# Patient Record
Sex: FEMALE | Race: WHITE
[De-identification: names, ages, dates, MRNs, and addresses within clinical notes are randomized per-mention and may not be internally consistent; named-entity substitution may affect disease eponyms.]

---

## 2017-10-14 ENCOUNTER — HOSPITAL ENCOUNTER (EMERGENCY)
Dept: HOSPITAL 17 - NEPE | Age: 52
Discharge: HOME | End: 2017-10-14
Payer: COMMERCIAL

## 2017-10-14 VITALS
HEART RATE: 73 BPM | OXYGEN SATURATION: 96 % | TEMPERATURE: 97.7 F | SYSTOLIC BLOOD PRESSURE: 87 MMHG | DIASTOLIC BLOOD PRESSURE: 65 MMHG | RESPIRATION RATE: 16 BRPM

## 2017-10-14 VITALS — SYSTOLIC BLOOD PRESSURE: 87 MMHG | DIASTOLIC BLOOD PRESSURE: 53 MMHG

## 2017-10-14 DIAGNOSIS — Z79.899: ICD-10-CM

## 2017-10-14 DIAGNOSIS — F17.200: ICD-10-CM

## 2017-10-14 DIAGNOSIS — R10.31: Primary | ICD-10-CM

## 2017-10-14 DIAGNOSIS — Z99.3: ICD-10-CM

## 2017-10-14 DIAGNOSIS — G82.20: ICD-10-CM

## 2017-10-14 LAB
ANION GAP SERPL CALC-SCNC: 6 MEQ/L (ref 5–15)
APTT BLD: 28.9 SEC (ref 24.3–30.1)
BASOPHILS # BLD AUTO: 0.1 TH/MM3 (ref 0–0.2)
BASOPHILS NFR BLD: 0.7 % (ref 0–2)
BUN SERPL-MCNC: 20 MG/DL (ref 7–18)
CHLORIDE SERPL-SCNC: 101 MEQ/L (ref 98–107)
EOSINOPHIL # BLD: 0.3 TH/MM3 (ref 0–0.4)
EOSINOPHIL NFR BLD: 2.3 % (ref 0–4)
ERYTHROCYTE [DISTWIDTH] IN BLOOD BY AUTOMATED COUNT: 13 % (ref 11.6–17.2)
GFR SERPLBLD BASED ON 1.73 SQ M-ARVRAT: 73 ML/MIN (ref 89–?)
HCO3 BLD-SCNC: 25.2 MEQ/L (ref 21–32)
HCT VFR BLD CALC: 40.6 % (ref 35–46)
HEMO FLAGS: (no result)
INR PPP: 1 RATIO
LYMPHOCYTES # BLD AUTO: 2.8 TH/MM3 (ref 1–4.8)
LYMPHOCYTES NFR BLD AUTO: 25.6 % (ref 9–44)
MCH RBC QN AUTO: 29.2 PG (ref 27–34)
MCHC RBC AUTO-ENTMCNC: 33.4 % (ref 32–36)
MCV RBC AUTO: 87.5 FL (ref 80–100)
MONOCYTES NFR BLD: 7.7 % (ref 0–8)
NEUTROPHILS # BLD AUTO: 7 TH/MM3 (ref 1.8–7.7)
NEUTROPHILS NFR BLD AUTO: 63.7 % (ref 16–70)
PLATELET # BLD: 217 TH/MM3 (ref 150–450)
POTASSIUM SERPL-SCNC: 4.4 MEQ/L (ref 3.5–5.1)
PROTHROMBIN TIME: 10.5 SEC (ref 9.8–11.6)
RBC # BLD AUTO: 4.64 MIL/MM3 (ref 4–5.3)
SODIUM SERPL-SCNC: 132 MEQ/L (ref 136–145)
WBC # BLD AUTO: 11 TH/MM3 (ref 4–11)

## 2017-10-14 PROCEDURE — 80048 BASIC METABOLIC PNL TOTAL CA: CPT

## 2017-10-14 PROCEDURE — 85610 PROTHROMBIN TIME: CPT

## 2017-10-14 PROCEDURE — 99285 EMERGENCY DEPT VISIT HI MDM: CPT

## 2017-10-14 PROCEDURE — 85730 THROMBOPLASTIN TIME PARTIAL: CPT

## 2017-10-14 PROCEDURE — 85025 COMPLETE CBC W/AUTO DIFF WBC: CPT

## 2017-10-14 PROCEDURE — 74177 CT ABD & PELVIS W/CONTRAST: CPT

## 2017-10-14 PROCEDURE — 96374 THER/PROPH/DIAG INJ IV PUSH: CPT

## 2017-10-14 NOTE — RADRPT
EXAM DATE/TIME:  10/14/2017 18:05 

 

[facility] COMPARISON:     

CT ABDOMEN & PELVIS W CONTRAST, November 05, 2014, 1:12.

 

 

INDICATIONS :     

Abdominal pain.

                      

 

IV CONTRAST:     

96 cc Omnipaque 350 (iohexol) IV 

 

 

ORAL CONTRAST:      

No oral contrast ingested.

                      

 

RADIATION DOSE:     

10.38 CTDIvol (mGy) 

 

 

MEDICAL HISTORY :     

Cardiovascular disease. Hepatitis. Renal calculi.Quadraplegia, TB

 

SURGICAL HISTORY :      

Tubal ligation. 

 

ENCOUNTER:      

Initial

 

ACUITY:      

1 day

 

PAIN SCALE:      

6/10

 

LOCATION:         

abdomen

 

TECHNIQUE:     

Volumetric scanning of the abdomen and pelvis was performed.  Using automated exposure control and ad
justment of the mA and/or kV according to patient size, radiation dose was kept as low as reasonably 
achievable to obtain optimal diagnostic quality images.  DICOM format image data is available electro
nically for review and comparison.  

 

FINDINGS:     

 

LOWER LUNGS:     

The visualized lower lungs are clear.

 

LIVER:     

Homogeneous density without lesion.  There is no dilation of the biliary tree.  No calcified gallston
es.

 

SPLEEN:     

Normal size without lesion.

 

PANCREAS:     

Within normal limits.

 

KIDNEYS:     

Bilateral renal cortical thinning is noted. There is no evidence of hydronephrosis. There are no susp
icious lesions or evidence of nephrolithiasis.

 

ADRENAL GLANDS:     

Within normal limits.

 

VASCULAR:     

There is no aortic aneurysm.

 

BOWEL/MESENTERY:     

The stomach, small bowel, and colon demonstrate no acute abnormality.  There is no free intraperitone
al air or fluid. Fecal impaction is noted.

 

ABDOMINAL WALL:     

Within normal limits.

 

RETROPERITONEUM:     

There is no lymphadenopathy. 

 

BLADDER:     

Ott catheter is identified. No wall thickening or mass. 

 

REPRODUCTIVE:     

Within normal limits.

 

INGUINAL:     

There is no lymphadenopathy or hernia. 

 

MUSCULOSKELETAL:     

Severe arthropathy with chronic dislocations is identified in both hips. There are prominent bilatera
l joint effusions.

 

CONCLUSION:     

1. No evidence of acute intestinal process, abdominal/pelvic inflammatory disease, hydronephrosis or 
lymphadenopathy.

2. Bilateral renal cortical scarring.

3. Chronic bilateral hip dislocations with prominent joint effusion is then advanced arthropathy.

 

 

 

 Yadiel Zaman MD on October 14, 2017 at 18:29           

Board Certified Radiologist.

 This report was verified electronically.

## 2017-10-14 NOTE — PD
HPI


Chief Complaint:  Flank/Kidney Pain


Time Seen by Provider:  17:12


Travel History


International Travel<30 days:  No


Contact w/Intl Traveler<30days:  No


Traveled to known affect area:  No





History of Present Illness


HPI


The patient was seen and examined in the presence of the nurse.  This patient 

complains of abdominal pain.  Location is right lower quadrant.  Duration 13 

hours.  Severity is moderate.  No vomiting or diarrhea or rectal bleeding or 

fever.  She denies history of abdominal surgeries.  She does have paraplegia 

due to spinal abscess and is wheelchair bound and has a chronic indwelling 

Ott.  It  was changed 4 days ago.  She is no urinary symptoms.  No 

alleviating factors.  No exacerbating factors





PFSH


Past Medical History


Arthritis:  No


Asthma:  No


Autoimmune Disease:  No


Blood Disorders:  No


Anxiety:  No


Depression:  No


Heart Rhythm Problems:  No


Cancer:  No


Cardiovascular Problems:  Yes (HYPOTENSION)


Chemotherapy:  No


Chest Pain:  No


Congestive Heart Failure:  No


COPD:  No


Cerebrovascular Accident:  No


Diabetes:  No


Diminished Hearing:  No


Endocrine:  No


GERD:  No


Glaucoma:  No


Genitourinary:  Yes (UTI)


Headaches:  No


Hepatitis:  Yes


Hiatal Hernia:  No


Hypertension:  No


Immune Disorder:  No


Kidney Stones:  No


Musculoskeletal:  Yes


Neurologic:  Yes (PARAPLEGIC)


Psychiatric:  No


Reproductive:  No


Respiratory:  Yes (TB ; PE 11/10)


Immunizations Current:  Yes


Migraines:  No


Myocardial Infarction:  No


Radiation Therapy:  No


Renal Failure:  No


Seizures:  No


Sickle Cell Disease:  No


Sleep Apnea:  No


Thyroid Disease:  No


Ulcer:  No


PNEUMOCCOCAL Vaccine (Year):  1


Pregnant?:  Not Pregnant


:  4


Para:  3


Miscarriage:  1


Ovarian Cysts:  No


Tubal Ligation:  Yes





Past Surgical History


Abdominal Surgery:  No


AICD:  No


Appendectomy:  No


Arteriovenous Shunt:  No


Cardiac Surgery:  No


Cholecystectomy:  No


Ear Surgery:  No


Endocrine Surgery:  No


Eye Surgery:  No


Genitourinary Surgery:  No


Gynecologic Surgery:  Yes


Insulin Pump:  No


Joint Replacement:  No


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


Oral Surgery:  No


Pacemaker:  No


Thoracic Surgery:  No


Other Surgery:  Yes (MMESH TO PREVENT CLOTS)





Social History


Alcohol Use:  No


Tobacco Use:  Yes (2 PPD)


Substance Use:  No





Allergies-Medications


(Allergen,Severity, Reaction):  


Coded Allergies:  


     Sulfa (Sulfonamide Antibiotics) (Unverified  Allergy, Intermediate, 

blisters, 8/15/17)


     levofloxacin (Unverified  Allergy, Intermediate, BLISTERS, 8/15/17)


     warfarin (Unverified  Adverse Reaction, Intermediate, UNABLE TO REGULATE, 8

/15/17)


Reported Meds & Prescriptions





Reported Meds & Active Scripts


Active


Reported


Lyrica (Pregabalin) 50 Mg Cap 50 Mg PO BID


Xanax (Alprazolam) 1 Mg Tab 1 Mg PO Q8H PRN


Baclofen 20 Mg Tab 20 Mg PO TID


Effexor XR 24 HR (Venlafaxine HCl) 150 Mg Cap 150 Mg PO DAILY








Review of Systems


General / Constitutional:  No: Fever


Eyes:  No: Visual changes


HENT:  No: Headaches


Cardiovascular:  No: Chest Pain or Discomfort


Respiratory:  No: Shortness of Breath


Gastrointestinal:  Positive: Abdominal Pain


Genitourinary:  No: Dysuria


Musculoskeletal:  No: Pain


Skin:  No Rash


Neurologic:  Positive: Weakness


Psychiatric:  No: Depression


Endocrine:  No: Polydipsia


Hematologic/Lymphatic:  No: Easy Bruising





Physical Exam


Narrative


GENERAL: Well-nourished, well-developed patient with some abdominal pain .


SKIN: Focused skin assessment reveals no rash and nodules. Skin is Warm and 

dry.  Macular erythema in the right inguinal fold


HEAD: Atraumatic. Normocephalic. 


EYES: Pupils equal and round. No scleral icterus. No injection or drainage. 


ENT: No nasal bleeding or discharge.  Mucous membranes pink and moist.


NECK: Trachea midline. No JVD. 


CARDIOVASCULAR: Regular rate and rhythm.  No murmur appreciated.


RESPIRATORY: No accessory muscle use. Clear to auscultation. Breath sounds 

equal bilaterally. 


GASTROINTESTINAL: Abdomen soft, mild right lower quadrant tenderness without 

rebound or guarding, nondistended. Hepatic and splenic margins not palpable. 


MUSCULOSKELETAL: No obvious deformities. No clubbing.  No cyanosis.  No edema. 


NEUROLOGICAL: Awake and alert. No obvious cranial nerve deficits.  Motor 

grossly within normal limits. Normal speech.


PSYCHIATRIC: Appropriate mood and affect; insight and judgment normal.





Data


Data


Last Documented VS





Vital Signs








  Date Time  Temp Pulse Resp B/P (MAP) Pulse Ox O2 Delivery O2 Flow Rate FiO2


 


10/14/17 17:06 97.7 73 16 87/65 (72) 96   








Orders





 Orders


Basic Metabolic Panel (Bmp) (10/14/17 17:33)


Complete Blood Count With Diff (10/14/17 17:33)


Prothrombin Time / Inr (Pt) (10/14/17 17:33)


Act Partial Throm Time (Ptt) (10/14/17 17:33)


Ct Abd/Pel W Iv Contrast(Rout) (10/14/17 17:33)


Iv Access Insert/Monitor (10/14/17 17:33)


NPO (10/14/17 17:33)


Sodium Chloride 0.9% Flush (Ns Flush) (10/14/17 17:45)


Ondansetron Inj (Zofran Inj) (10/14/17 18:00)


Morphine Inj (Morphine Inj) (10/14/17 18:00)


Iohexol 350 Inj (Omnipaque 350 Inj) (10/14/17 18:16)





Labs





Laboratory Tests








Test


  10/14/17


17:30


 


White Blood Count 11.0 TH/MM3 


 


Red Blood Count 4.64 MIL/MM3 


 


Hemoglobin 13.6 GM/DL 


 


Hematocrit 40.6 % 


 


Mean Corpuscular Volume 87.5 FL 


 


Mean Corpuscular Hemoglobin 29.2 PG 


 


Mean Corpuscular Hemoglobin


Concent 33.4 % 


 


 


Red Cell Distribution Width 13.0 % 


 


Platelet Count 217 TH/MM3 


 


Mean Platelet Volume 8.3 FL 


 


Neutrophils (%) (Auto) 63.7 % 


 


Lymphocytes (%) (Auto) 25.6 % 


 


Monocytes (%) (Auto) 7.7 % 


 


Eosinophils (%) (Auto) 2.3 % 


 


Basophils (%) (Auto) 0.7 % 


 


Neutrophils # (Auto) 7.0 TH/MM3 


 


Lymphocytes # (Auto) 2.8 TH/MM3 


 


Monocytes # (Auto) 0.8 TH/MM3 


 


Eosinophils # (Auto) 0.3 TH/MM3 


 


Basophils # (Auto) 0.1 TH/MM3 


 


CBC Comment DIFF FINAL 


 


Differential Comment  


 


Prothrombin Time 10.5 SEC 


 


Prothromb Time International


Ratio 1.0 RATIO 


 


 


Activated Partial


Thromboplast Time 28.9 SEC 


 


 


Blood Urea Nitrogen 20 MG/DL 


 


Creatinine 0.82 MG/DL 


 


Random Glucose 88 MG/DL 


 


Calcium Level 9.0 MG/DL 


 


Sodium Level 132 MEQ/L 


 


Potassium Level 4.4 MEQ/L 


 


Chloride Level 101 MEQ/L 


 


Carbon Dioxide Level 25.2 MEQ/L 


 


Anion Gap 6 MEQ/L 


 


Estimat Glomerular Filtration


Rate 73 ML/MIN 


 











MDM


Medical Decision Making


Medical Screen Exam Complete:  Yes


Emergency Medical Condition:  Yes


Medical Record Reviewed:  Yes


Differential Diagnosis


Appendicitis, colitis, ileus


Narrative Course


I have reviewed the patient's electronic medical record.  Patient was last here 

in  with UTI and chronic abdominal pain.





CBC and metabolic profiles are reasonably normal





CT scan shows no evidence of inflammatory condition, no appendicitis changes





On recheck the patient is feeling well and improved


She does have some degree of chronic abdominal pain.


Stable for outpatient follow-up


Discussed constipation/colonic stool noted with patient.  She says she is 

chronically constipated.  She denies constipating narcotics.


She will discuss bowel regimen with her physician.





Diagnosis





 Primary Impression:  


 Abdominal pain


 Qualified Codes:  R10.31 - Right lower quadrant pain


 Additional Impression:  


 Chronic paraplegia


Disposition:   DISCHARGE HOME


Condition:  Stable











Minh Marley MD Oct 14, 2017 17:43

## 2018-01-09 ENCOUNTER — HOSPITAL ENCOUNTER (INPATIENT)
Dept: HOSPITAL 17 - NEPE | Age: 53
LOS: 4 days | Discharge: HOME HEALTH | DRG: 189 | End: 2018-01-13
Attending: HOSPITALIST | Admitting: HOSPITALIST
Payer: COMMERCIAL

## 2018-01-09 VITALS
RESPIRATION RATE: 18 BRPM | DIASTOLIC BLOOD PRESSURE: 73 MMHG | SYSTOLIC BLOOD PRESSURE: 116 MMHG | OXYGEN SATURATION: 97 % | HEART RATE: 106 BPM | TEMPERATURE: 98.1 F

## 2018-01-09 VITALS
OXYGEN SATURATION: 97 % | RESPIRATION RATE: 24 BRPM | TEMPERATURE: 98.3 F | DIASTOLIC BLOOD PRESSURE: 75 MMHG | SYSTOLIC BLOOD PRESSURE: 121 MMHG | HEART RATE: 106 BPM

## 2018-01-09 VITALS — BODY MASS INDEX: 20.72 KG/M2 | HEIGHT: 68 IN | WEIGHT: 136.69 LBS

## 2018-01-09 VITALS — OXYGEN SATURATION: 91 %

## 2018-01-09 VITALS — OXYGEN SATURATION: 97 %

## 2018-01-09 VITALS — OXYGEN SATURATION: 93 %

## 2018-01-09 VITALS
HEART RATE: 68 BPM | RESPIRATION RATE: 18 BRPM | SYSTOLIC BLOOD PRESSURE: 91 MMHG | DIASTOLIC BLOOD PRESSURE: 55 MMHG | TEMPERATURE: 98.5 F | OXYGEN SATURATION: 90 %

## 2018-01-09 VITALS — OXYGEN SATURATION: 96 %

## 2018-01-09 VITALS — HEART RATE: 99 BPM

## 2018-01-09 DIAGNOSIS — J44.0: ICD-10-CM

## 2018-01-09 DIAGNOSIS — Z86.11: ICD-10-CM

## 2018-01-09 DIAGNOSIS — G89.29: ICD-10-CM

## 2018-01-09 DIAGNOSIS — G82.20: ICD-10-CM

## 2018-01-09 DIAGNOSIS — Z88.1: ICD-10-CM

## 2018-01-09 DIAGNOSIS — I10: ICD-10-CM

## 2018-01-09 DIAGNOSIS — T83.511A: ICD-10-CM

## 2018-01-09 DIAGNOSIS — J96.01: Primary | ICD-10-CM

## 2018-01-09 DIAGNOSIS — F17.210: ICD-10-CM
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
## 2018-01-09 DIAGNOSIS — N39.0: ICD-10-CM

## 2018-01-09 DIAGNOSIS — J18.9: ICD-10-CM

## 2018-01-09 DIAGNOSIS — Z86.711: ICD-10-CM

## 2018-01-09 DIAGNOSIS — E83.39: ICD-10-CM

## 2018-01-09 LAB
ALBUMIN SERPL-MCNC: 3.6 GM/DL (ref 3.4–5)
ALP SERPL-CCNC: 99 U/L (ref 45–117)
ALT SERPL-CCNC: 18 U/L (ref 10–53)
AMORPHOUS SEDIMENT, URINE: (no result)
AST SERPL-CCNC: 23 U/L (ref 15–37)
BACTERIA #/AREA URNS HPF: (no result) /HPF
BASOPHILS # BLD AUTO: 0.1 TH/MM3 (ref 0–0.2)
BASOPHILS NFR BLD: 0.4 % (ref 0–2)
BILIRUB SERPL-MCNC: 0.3 MG/DL (ref 0.2–1)
BUN SERPL-MCNC: 7 MG/DL (ref 7–18)
CALCIUM SERPL-MCNC: 9.8 MG/DL (ref 8.5–10.1)
CHLORIDE SERPL-SCNC: 98 MEQ/L (ref 98–107)
COLOR UR: YELLOW
CREAT SERPL-MCNC: 0.67 MG/DL (ref 0.5–1)
EOSINOPHIL # BLD: 0.1 TH/MM3 (ref 0–0.4)
EOSINOPHIL NFR BLD: 0.4 % (ref 0–4)
ERYTHROCYTE [DISTWIDTH] IN BLOOD BY AUTOMATED COUNT: 13.4 % (ref 11.6–17.2)
GFR SERPLBLD BASED ON 1.73 SQ M-ARVRAT: 91 ML/MIN (ref 89–?)
GLUCOSE SERPL-MCNC: 82 MG/DL (ref 74–106)
GLUCOSE UR STRIP-MCNC: (no result) MG/DL
HCO3 BLD-SCNC: 27.5 MEQ/L (ref 21–32)
HCT VFR BLD CALC: 42.8 % (ref 35–46)
HGB BLD-MCNC: 14.5 GM/DL (ref 11.6–15.3)
HGB UR QL STRIP: (no result)
KETONES UR STRIP-MCNC: (no result) MG/DL
LACTIC ACID SEPSIS PROTOCOL: 3 MMOL/L (ref 0.4–2)
LIPASE: 95 U/L (ref 73–393)
LYMPHOCYTES # BLD AUTO: 2.8 TH/MM3 (ref 1–4.8)
LYMPHOCYTES NFR BLD AUTO: 19.9 % (ref 9–44)
MAGNESIUM SERPL-MCNC: 2.2 MG/DL (ref 1.5–2.5)
MCH RBC QN AUTO: 29.3 PG (ref 27–34)
MCHC RBC AUTO-ENTMCNC: 33.8 % (ref 32–36)
MCV RBC AUTO: 86.6 FL (ref 80–100)
MONOCYTE #: 0.7 TH/MM3 (ref 0–0.9)
MONOCYTES NFR BLD: 5.2 % (ref 0–8)
MUCOUS THREADS #/AREA URNS LPF: (no result) /LPF
NEUTROPHILS # BLD AUTO: 10.4 TH/MM3 (ref 1.8–7.7)
NEUTROPHILS NFR BLD AUTO: 74.1 % (ref 16–70)
NITRITE UR QL STRIP: (no result)
PLATELET # BLD: 393 TH/MM3 (ref 150–450)
PMV BLD AUTO: 8.5 FL (ref 7–11)
PROT SERPL-MCNC: 8.4 GM/DL (ref 6.4–8.2)
RBC # BLD AUTO: 4.94 MIL/MM3 (ref 4–5.3)
SODIUM SERPL-SCNC: 133 MEQ/L (ref 136–145)
SP GR UR STRIP: 1.01 (ref 1–1.03)
TROPONIN I SERPL-MCNC: (no result) NG/ML (ref 0.02–0.05)
URINE LEUKOCYTE ESTERASE: (no result)
WBC # BLD AUTO: 14 TH/MM3 (ref 4–11)

## 2018-01-09 PROCEDURE — 87804 INFLUENZA ASSAY W/OPTIC: CPT

## 2018-01-09 PROCEDURE — 96365 THER/PROPH/DIAG IV INF INIT: CPT

## 2018-01-09 PROCEDURE — 96372 THER/PROPH/DIAG INJ SC/IM: CPT

## 2018-01-09 PROCEDURE — 85027 COMPLETE CBC AUTOMATED: CPT

## 2018-01-09 PROCEDURE — 84484 ASSAY OF TROPONIN QUANT: CPT

## 2018-01-09 PROCEDURE — 83735 ASSAY OF MAGNESIUM: CPT

## 2018-01-09 PROCEDURE — 82550 ASSAY OF CK (CPK): CPT

## 2018-01-09 PROCEDURE — 84100 ASSAY OF PHOSPHORUS: CPT

## 2018-01-09 PROCEDURE — 87086 URINE CULTURE/COLONY COUNT: CPT

## 2018-01-09 PROCEDURE — 93005 ELECTROCARDIOGRAM TRACING: CPT

## 2018-01-09 PROCEDURE — 87641 MR-STAPH DNA AMP PROBE: CPT

## 2018-01-09 PROCEDURE — 81001 URINALYSIS AUTO W/SCOPE: CPT

## 2018-01-09 PROCEDURE — 85025 COMPLETE CBC W/AUTO DIFF WBC: CPT

## 2018-01-09 PROCEDURE — 83690 ASSAY OF LIPASE: CPT

## 2018-01-09 PROCEDURE — 36600 WITHDRAWAL OF ARTERIAL BLOOD: CPT

## 2018-01-09 PROCEDURE — 96375 TX/PRO/DX INJ NEW DRUG ADDON: CPT

## 2018-01-09 PROCEDURE — 83605 ASSAY OF LACTIC ACID: CPT

## 2018-01-09 PROCEDURE — 87186 SC STD MICRODIL/AGAR DIL: CPT

## 2018-01-09 PROCEDURE — 71275 CT ANGIOGRAPHY CHEST: CPT

## 2018-01-09 PROCEDURE — 94002 VENT MGMT INPAT INIT DAY: CPT

## 2018-01-09 PROCEDURE — 3E0F7GC INTRODUCTION OF OTHER THERAPEUTIC SUBSTANCE INTO RESPIRATORY TRACT, VIA NATURAL OR ARTIFICIAL OPENING: ICD-10-PCS

## 2018-01-09 PROCEDURE — 94003 VENT MGMT INPAT SUBQ DAY: CPT

## 2018-01-09 PROCEDURE — 80048 BASIC METABOLIC PNL TOTAL CA: CPT

## 2018-01-09 PROCEDURE — 82805 BLOOD GASES W/O2 SATURATION: CPT

## 2018-01-09 PROCEDURE — 87077 CULTURE AEROBIC IDENTIFY: CPT

## 2018-01-09 PROCEDURE — 87040 BLOOD CULTURE FOR BACTERIA: CPT

## 2018-01-09 PROCEDURE — 94640 AIRWAY INHALATION TREATMENT: CPT

## 2018-01-09 PROCEDURE — 94664 DEMO&/EVAL PT USE INHALER: CPT

## 2018-01-09 PROCEDURE — 87449 NOS EACH ORGANISM AG IA: CPT

## 2018-01-09 PROCEDURE — 94667 MNPJ CHEST WALL 1ST: CPT

## 2018-01-09 PROCEDURE — 71045 X-RAY EXAM CHEST 1 VIEW: CPT

## 2018-01-09 PROCEDURE — 80053 COMPREHEN METABOLIC PANEL: CPT

## 2018-01-09 RX ADMIN — METHYLPREDNISOLONE SODIUM SUCCINATE SCH MG: 40 INJECTION, POWDER, FOR SOLUTION INTRAMUSCULAR; INTRAVENOUS at 19:49

## 2018-01-09 RX ADMIN — ENOXAPARIN SODIUM SCH MG: 40 INJECTION SUBCUTANEOUS at 16:20

## 2018-01-09 RX ADMIN — IPRATROPIUM BROMIDE AND ALBUTEROL SULFATE SCH AMPULE: .5; 3 SOLUTION RESPIRATORY (INHALATION) at 19:18

## 2018-01-09 RX ADMIN — Medication SCH ML: at 19:49

## 2018-01-09 RX ADMIN — IPRATROPIUM BROMIDE AND ALBUTEROL SULFATE SCH AMPULE: .5; 3 SOLUTION RESPIRATORY (INHALATION) at 15:44

## 2018-01-09 RX ADMIN — MORPHINE SULFATE PRN MG: 2 INJECTION, SOLUTION INTRAMUSCULAR; INTRAVENOUS at 21:31

## 2018-01-09 NOTE — HHI.HP
__________________________________________________





HPI


Service


[facility]


Primary Care Physician


Unknown


Admission Diagnosis





UTI, Hypoxia, Weakness


Diagnoses:  


Chief Complaint:  


Cough, shortness of breath


Travel History


International Travel<30 Days:  No


Contact w/Intl Traveler <30 Da:  No


Traveled to Known Affected Are:  No





Sepsis Criteria


SIRS Criteria (2 or more):  Heart rate over 90, RR  > 20 or PaCO2 < 32


Sepsis Criteria (SIRS+source):  Infect source susp/known


Severe Sepsis (+one):  Lactate >2


History of Present Illness


Ms. Campos is a pleasant 53 year old female with history of paraplegia, TB (

), PE (), neck abscess who presented to the ED on 2018 due to 

significant cough, shortness of breath that started about 10 days prior to this 

admission. She reports profuse cough production and due to coughing fits, she 

has chest pain, abdominal pain. Per EMS, her urine appeared to be cloudy too. 

Patient has chronic Ott catheter. Additionally she reports not able to sleep 

or eat much. Today she noted speck of blood in the sputum as well. In the ED, 

she was severely hypoxic and required non-rebreather to maintain O2 sat > 90%. 

Her HR was 106, Resp 24, Temp 98.3 and /75.





Review of Systems


Except as stated in HPI:  all other systems reviewed are Neg





Past Family Social History


Past Medical History


Hypotension, UTI, paraplegic, TB in , PE in 


Past Surgical History


Neck abscess drainage in 2010


Reported Medications


Lyrica (Pregabalin) 50 Mg Cap 50 Mg PO BID


Xanax (Alprazolam) 1 Mg Tab 1 Mg PO Q8H PRN


Baclofen 20 Mg Tab 20 Mg PO TID


Effexor XR 24 HR (Venlafaxine HCl) 150 Mg Cap 150 Mg PO DAILY


Allergies:  


Coded Allergies:  


     Sulfa (Sulfonamide Antibiotics) (Unverified  Allergy, Intermediate, 

blisters, 18)


     levofloxacin (Unverified  Allergy, Intermediate, BLISTERS, 18)


     warfarin (Unverified  Adverse Reaction, Intermediate, UNABLE TO REGULATE, )


Family History


Mother - Parkinson's.


Social History


Patient reports smoking half a pack a day.  Denies using illicit drugs or 

alcohol.





Physical Exam


Vital Signs





Vital Signs








  Date Time  Temp Pulse Resp B/P (MAP) Pulse Ox O2 Delivery O2 Flow Rate FiO2


 


18 14:22     97 Non-Rebreather 15.00 


 


18 13:19     96 Non-Rebreather 15.00 


 


18 13:17     96 Non-Rebreather 15.00 


 


18 13:17     96 Non-Rebreather 15.00 


 


18 13:04 98.3 106 24 121/75 (90) 97   








Physical Exam


GENERAL: This is a well-nourished, well-developed patient, in no apparent 

distress.


SKIN: No rashes, ecchymoses or lesions. Warm and dry.


HEAD: Atraumatic. Normocephalic. No temporal or scalp tenderness.


EYES: Pupils equal round and reactive. No injection or drainage. 


ENT: Nose without bleeding, purulent drainage or septal hematoma. Airway patent.


NECK: Trachea midline. No lymphadenopathy. Supple, nontender, no meningeal 

signs.


CARDIOVASCULAR: Regular rate and rhythm without murmurs, gallops, or rubs. No 

JVD. 


RESPIRATORY: Coarse breath sounds diffusely. No appreciable wheezing. No 

crackles. 


GASTROINTESTINAL: Abdomen soft, non-tender, nondistended. No guarding.


MUSCULOSKELETAL: Extremities without clubbing, cyanosis, or edema. 


NEUROLOGICAL: Awake and alert. Cranial nerves II through XII intact. No focal 

neurological deficits. Normal speech.


Laboratory





Laboratory Tests








Test


  18


13:15 18


13:25 18


13:27


 


White Blood Count 14.0   


 


Red Blood Count 4.94   


 


Hemoglobin 14.5   


 


Hematocrit 42.8   


 


Mean Corpuscular Volume 86.6   


 


Mean Corpuscular Hemoglobin 29.3   


 


Mean Corpuscular Hemoglobin


Concent 33.8 


  


  


 


 


Red Cell Distribution Width 13.4   


 


Platelet Count 393   


 


Mean Platelet Volume 8.5   


 


Neutrophils (%) (Auto) 74.1   


 


Lymphocytes (%) (Auto) 19.9   


 


Monocytes (%) (Auto) 5.2   


 


Eosinophils (%) (Auto) 0.4   


 


Basophils (%) (Auto) 0.4   


 


Neutrophils # (Auto) 10.4   


 


Lymphocytes # (Auto) 2.8   


 


Monocytes # (Auto) 0.7   


 


Eosinophils # (Auto) 0.1   


 


Basophils # (Auto) 0.1   


 


CBC Comment DIFF FINAL   


 


Differential Comment    


 


Blood Urea Nitrogen 7   


 


Creatinine 0.67   


 


Random Glucose 82   


 


Total Protein 8.4   


 


Albumin 3.6   


 


Calcium Level 9.8   


 


Magnesium Level 2.2   


 


Alkaline Phosphatase 99   


 


Aspartate Amino Transf


(AST/SGOT) 23 


  


  


 


 


Alanine Aminotransferase


(ALT/SGPT) 18 


  


  


 


 


Total Bilirubin 0.3   


 


Sodium Level 133   


 


Potassium Level 4.0   


 


Chloride Level 98   


 


Carbon Dioxide Level 27.5   


 


Anion Gap 8   


 


Estimat Glomerular Filtration


Rate 91 


  


  


 


 


Lactic Acid Level 3.0   


 


Total Creatine Kinase 90   


 


Lipase 95   


 


Urine Color  YELLOW  


 


Urine Turbidity  CLOUDY  


 


Urine pH  7.5  


 


Urine Specific Gravity  1.013  


 


Urine Protein  30  


 


Urine Glucose (UA)  NEG  


 


Urine Ketones  NEG  


 


Urine Occult Blood  SMALL  


 


Urine Nitrite  NEG  


 


Urine Bilirubin  NEG  


 


Urine Urobilinogen  LESS THAN 2.0  


 


Urine Leukocyte Esterase  LARGE  


 


Urine RBC  8  


 


Urine WBC  112  


 


Urine Amorphous Sediment  OCC  


 


Urine Bacteria  MANY  


 


Urine Mucus  MANY  


 


Microscopic Urinalysis Comment


  


  CATH-CULTURE


IND 


 


 


Blood Gas Puncture Site   RT RADIAL 


 


Blood Gas Patient Temperature   98.6 


 


Blood Gas HCO3   25 


 


Blood Gas Base Excess   1.6 


 


Blood Gas Oxygen Saturation   91 


 


Arterial Blood pH   7.44 


 


Arterial Blood Partial


Pressure CO2 


  


  38 


 


 


Arterial Blood Partial


Pressure O2 


  


  71 


 


 


Arterial Blood Oxygen Content   18.0 


 


Arterial Blood


Carboxyhemoglobin 


  


  2.3 


 


 


Arterial Blood Methemoglobin   0.8 


 


Blood Gas Hemoglobin   14.1 


 


Oxygen Delivery Device   NRM 


 


Blood Gas Liter Flow   15 














 Date/Time


Source Procedure


Growth Status


 


 


 18 13:15


Blood Peripheral Aerobic Blood Culture


Pending Received


 


 18 13:15


Blood Peripheral Anaerobic Blood Culture


Pending Received


 


 18 13:30


Nasal Aspirate Influenza Types A,B Antigen (SHANNON) - Final


NEGATIVE FOR FLU A AND B ANTIGEN.... Complete


 


 18 13:25


Urine Catheterized Urine Urine Culture


Pending Received








Result Diagram:  


18 1315                                                                    

            18 1315





Imaging





Last Impressions








Chest X-Ray 18 1305 Signed





Impressions: 





 Service Date/Time:  2018 13:17 - CONCLUSION:  1. Minimal 





 bibasilar atelectasis/scarring.     Alireza Bozorgmanesh, MD Caprini VTE Risk Assessment


Caprini VTE Risk Assessment:  Mod/High Risk (score >= 2)


Ankushi Risk Assessment Model











 Point Value = 1          Point Value = 2  Point Value = 3  Point Value = 5


 


Age 41-60


Minor surgery


BMI > 25 kg/m2


Swollen legs


Varicose veins


Pregnancy or postpartum


History of unexplained or recurrent


   spontaneous 


Oral contraceptives or hormone


   replacement


Sepsis (< 1 month)


Serious lung disease, including


   pneumonia (< 1 month)


Abnormal pulmonary function


Acute myocardial infarction


Congestive heart failure (< 1 month)


History of inflammatory bowel disease


Medical patient at bed rest Age 61-74


Arthroscopic surgery


Major open surgery (> 45 min)


Laparoscopic surgery (> 45 min)


Malignancy


Confined to bed (> 72 hours)


Immobilizing plaster cast


Central venous access Age >= 75


History of VTE


Family history of VTE


Factor V Leiden


Prothrombin 53519C


Lupus anticoagulant


Anticardiolipin antibodies


Elevated serum homocysteine


Heparin-induced thrombocytopenia


Other congenital or acquired


   thrombophilia Stroke (< 1 month)


Elective arthroplasty


Hip, pelvis, or leg fracture


Acute spinal cord injury (< 1 month)








Prophylaxis Regimen











   Total Risk


Factor Score Risk Level Prophylaxis Regimen


 


0-1      Low Early ambulation


 


2 Moderate Order ONE of the following:


*Sequential Compression Device (SCD)


*Heparin 5000 units SQ BID


 


3-4 Higher Order ONE of the following medications:


*Heparin 5000 units SQ TID


*Enoxaparin/Lovenox 40 mg SQ daily (WT < 150 kg, CrCl > 30 mL/min)


*Enoxaparin/Lovenox 30 mg SQ daily (WT < 150 kg, CrCl > 10-29 mL/min)


*Enoxaparin/Lovenox 30 mg SQ BID (WT < 150 kg, CrCl > 30 mL/min)


AND/OR


*Sequential Compression Device (SCD)


 


5 or more Highest Order ONE of the following medications:


*Heparin 5000 units SQ TID (Preferred with Epidurals)


*Enoxaparin/Lovenox 40 mg SQ daily (WT < 150 kg, CrCl > 30 mL/min)


*Enoxaparin/Lovenox 30 mg SQ daily (WT < 150 kg, CrCl > 10-29 mL/min)


*Enoxaparin/Lovenox 30 mg SQ BID (WT < 150 kg, CrCl > 30 mL/min)


AND


*Sequential Compression Device (SCD)











Assessment and Plan


Problem List:  


(1) Acute respiratory failure with hypoxia


ICD Code:  J96.01 - Acute respiratory failure with hypoxia


(2) Pneumonia


ICD Code:  J18.9 - Pneumonia, unspecified organism


(3) UTI (urinary tract infection)


ICD Code:  N39.0 - Urinary tract infection, site not specified


Status:  Acute


(4) Paraplegia


ICD Code:  G82.20 - Paraplegia, unspecified


Assessment and Plan


Ms. Campos is a pleasant 53 year old female with a history of paraplegia who 

presented to the ED on 2018 due to more than 10 day duration of productive 

cough, shortness of breath. 





- Acute respiratory failure with hypoxia


- Pneumonia


   - CXR reviewed by me, largely unremarkable.


   - Patient is requiring significant amount of O2 to keep O2 sat > 90%. 


   - We obtained a CT PE study to rule out PE and also to investigate any 

further lung pathology. 


   - CT PE shows no PE but shows patchy ground glass opacity, Posterior lung 

bases consolidation. CT PE study images reviewed by me. 


   - This could be due to atypical organisms. 


   - Will keep patient in the ICU, BiPAP if needed. 


   - Start Ceftriaxone 2g Q24hrs and Azithromycin 500mg IV Qday. If no 

improvement, we will consider anti-pseudomonal coverage. Patient is allergic to 

Levaquin. We could consider Cefepime. 


   - DuoNeb, IV steroid. 


   - Obtain Sputum culture, Urinary antigens. 





- COPD - continue steroid, abx, DuoNeb. 





- Paraplegia - occurred after neck abscess in . 





Full code. Lovenox. 





Total critical time spent more than 45 minutes.





Physician Certification


2 Midnight Certification Type:  Admission for Inpatient Services


Order for Inpatient Services


The services are ordered in accordance with Medicare regulations or non-

Medicare payer requirements, as applicable.  In the case of services not 

specified as inpatient-only, they are appropriately provided as inpatient 

services in accordance with the 2-midnight benchmark.


Estimated LOS (days):  2


 days is the estimated time the patient will need to remain in the hospital, 

assuming treatment plan goals are met and no additional complications.


Post-Hospital Plan:  Not yet determined





Problem Qualifiers





(1) UTI (urinary tract infection):  


Qualified Codes:  T83.511A - Infection and inflammatory reaction due to 

indwelling urethral catheter, initial encounter; N39.0 - Urinary tract infection

, site not specified








Valerie Ball DO 2018 15:17

## 2018-01-09 NOTE — PD.CONS
HPI


Service


Critical Care Medicine


Consult Requested By





Primary Care Physician


Unknown


History of Present Illness


53 year old female with history of paraplegia, TB (1986), PE (2010), neck 

abscess who presented to the ED on 1/9/2018 due to significant cough, shortness 

of breath that started about 10 days prior to this admission. She reports cough 

production and due to coughing fits, she has chest pain, abdominal pain. Per EMS

, her urine appeared to be cloudy too. Patient has chronic Ott catheter. 

Additionally she reports not able to sleep or eat much. Today she noted speck 

of blood in the sputum as well. In the ED, she was severely hypoxic and 

required non-rebreather to maintain O2 sat > 90%. Her HR was 106, Resp 24, Temp 

98.3 and /75.  She was admitted to ICU on hospitalist service, however 

developed worsening respiratory failure and critical care medicine was 

consulted for further management.





Review of Systems


ROS


Unobtainable patient on the facemask BiPAP





Past Family Social History


Allergies:  


Coded Allergies:  


     Sulfa (Sulfonamide Antibiotics) (Unverified  Allergy, Intermediate, 

blisters, 1/9/18)


     levofloxacin (Unverified  Allergy, Intermediate, BLISTERS, 1/9/18)


     warfarin (Unverified  Adverse Reaction, Intermediate, UNABLE TO REGULATE, 1 /9/18)


Past Medical History


Hypotension, UTI, paraplegic, TB in 1986, PE in 2010


Past Surgical History





Past Surgical History


Neck abscess drainage in July 2010


Reported Medications





Reported Meds & Active Scripts


Active


Reported


Lyrica (Pregabalin) 50 Mg Cap 50 Mg PO BID


Xanax (Alprazolam) 1 Mg Tab 1 Mg PO Q8H PRN


Baclofen 20 Mg Tab 20 Mg PO TID


Effexor XR 24 HR (Venlafaxine HCl) 150 Mg Cap 150 Mg PO DAILY


Active Ordered Medications





Current Medications


Acetaminophen (Tylenol) 650 mg ONCE  ONCE PO  Last administered on 1/9/18at 14:

32;  Start 1/9/18 at 13:15;  Stop 1/9/18 at 13:16;  Status DC


Vancomycin HCl 1000 mg/Sodium Chloride 250 ml @  250 mls/hr ONCE  STAT IV  Last 

administered on 1/9/18at 15:55;  Start 1/9/18 at 13:05;  Stop 1/9/18 at 14:04;  

Status DC


Cefepime HCl 2000 mg/Sodium Chloride 100 ml @  200 mls/hr ONCE  STAT IV  Last 

administered on 1/9/18at 14:31;  Start 1/9/18 at 13:05;  Stop 1/9/18 at 13:34;  

Status DC


Ketorolac Tromethamine (Toradol Inj) 15 mg ONCE  ONCE IV PUSH  Last 

administered on 1/9/18at 14:33;  Start 1/9/18 at 14:00;  Stop 1/9/18 at 14:02;  

Status DC


Methylprednisolone Sodium Succinate (SoluMEDROL INJ) 125 mg ONCE  ONCE IV PUSH  

Last administered on 1/9/18at 14:32;  Start 1/9/18 at 14:00;  Stop 1/9/18 at 14:

02;  Status DC


Albuterol/ Ipratropium (Duoneb Neb) 1 ampule ONCE  ONCE INH  Last administered 

on 1/9/18at 14:19;  Start 1/9/18 at 14:00;  Stop 1/9/18 at 14:02;  Status DC


Oxycodone/ Acetaminophen (Percocet  Mg) 1 tab ONCE  ONCE PO  Last 

administered on 1/9/18at 14:59;  Start 1/9/18 at 15:00;  Stop 1/9/18 at 15:01;  

Status DC


Sodium Chloride (NS Flush) 2 ml UNSCH  PRN IV FLUSH FLUSH AFTER USING IV ACCESS

;  Start 1/9/18 at 15:15


Sodium Chloride (NS Flush) 2 ml BID IV FLUSH  Last administered on 1/9/18at 19:

49;  Start 1/9/18 at 21:00


Acetaminophen (Tylenol) 650 mg Q4H  PRN PO Headache, fever, pain 1-4;  Start 1/9 /18 at 15:15


Ondansetron HCl (Zofran Inj) 4 mg Q6H  PRN IVP NAUSEA OR VOMITING;  Start 1/9/ 18 at 15:15


Enoxaparin Sodium (Lovenox Inj) 40 mg Q24H SQ  Last administered on 1/9/18at 16:

20;  Start 1/9/18 at 16:00


Naloxone HCl (Narcan Inj) 0.4 mg UNSCH  PRN IV PUSH SEE LABEL COMMENTS;  Start 1 /9/18 at 15:15


Magnesium Hydroxide (Milk Of Magnesia Liq) 30 ml Q12H  PRN PO Mild constipation

;  Start 1/9/18 at 15:15


Sennosides (Senokot) 17.2 mg Q12H  PRN PO Moderate constipation;  Start 1/9/18 

at 15:15


Bisacodyl (Dulcolax Supp) 10 mg DAILY  PRN RECTAL SEVERE CONSITIPATION;  Start 1 /9/18 at 15:15


Lactulose (Lactulose Liq) 30 ml DAILY  PRN PO SEVERE CONSITIPATION;  Start 1/9/ 18 at 15:15


Ceftriaxone Sodium 2000 mg/ Sodium Chloride 100 ml @  200 mls/hr Q24H IV  Last 

administered on 1/9/18at 22:14;  Start 1/9/18 at 22:00


Azithromycin (Zithromax) 500 mg Q24H PO  Last administered on 1/9/18at 16:20;  

Start 1/9/18 at 16:00;  Stop 1/9/18 at 23:18;  Status DC


Albuterol/ Ipratropium (Duoneb Neb) 1 ampule Q4HR WHILE AWAKE  NEB NEB  Last 

administered on 1/9/18at 19:18;  Start 1/9/18 at 16:00


Albuterol/ Ipratropium (Duoneb Neb) 1 ampule Q4HR NEB  PRN NEB Dyspnea;  Start 1 /9/18 at 15:15


Methylprednisolone Sodium Succinate (SoluMEDROL INJ) 40 mg Q6H IV PUSH  Last 

administered on 1/9/18at 19:49;  Start 1/9/18 at 20:00


Iohexol (Omnipaque 350 Inj) 54 ml STK-MED ONCE IVCONTRAST  Last administered on 

1/9/18at 17:35;  Start 1/9/18 at 17:35;  Stop 1/9/18 at 17:36;  Status DC


Miscellaneous Information Patient in critical care unit?  Ass... [id number] .[id number] ;  

Start 1/9/18 at 21:15


Chlorhexidine Gluconate (Chlorhexidine 2% Cloth) 3 pack DAILY@04 TOPICAL ;  

Start 1/10/18 at 04:00;  Stop 1/14/18 at 04:01


Chlorhexidine Gluconate (Chlorhexidine 2% Cloth) 3 pack UNSCH  PRN TOPICAL 

HYGIENIC CARE;  Start 1/9/18 at 21:15;  Stop 1/14/18 at 21:04


Morphine Sulfate (Morphine Inj) 2 mg Q3H  PRN IV PUSH pain >5 Last administered 

on 1/9/18at 21:31;  Start 1/9/18 at 21:15


Azithromycin 500 mg/Sodium Chloride 250 ml @  250 mls/hr Q24H IV ;  Start 1/10/

18 at 09:00


Family History


No family history significant for early coronary artery disease or malignancy


Mother - Parkinson's


Social History


Patient reports smoking half a pack a day.  Denies using illicit drugs or 

alcohol.





Physical Exam


Vital Signs





Vital Signs








  Date Time  Temp Pulse Resp B/P (MAP) Pulse Ox O2 Delivery O2 Flow Rate FiO2


 


1/9/18 22:08     93 BiPAP  45


 


1/9/18 22:05     93   45


 


1/9/18 22:00  99      


 


1/9/18 20:00 98.1 106 18 116/73 (87) 97   


 


1/9/18 19:47 98.5 68 18 91/55 (67) 90   


 


1/9/18 15:45     91 Nasal Cannula 6.00 


 


1/9/18 14:22     97 Non-Rebreather 15.00 


 


1/9/18 13:19     96 Non-Rebreather 15.00 


 


1/9/18 13:17     96 Non-Rebreather 15.00 


 


1/9/18 13:17     96 Non-Rebreather 15.00 


 


1/9/18 13:04 98.3 106 24 121/75 (90) 97   








Physical Exam


GENERAL: 53-year-old female pain O 3, on facemask BiPAP


SKIN: Focused skin assessment warm/dry.


HEAD: Atraumatic. Normocephalic. 


EYES: Pupils equal and round. No scleral icterus. No injection or drainage. 


ENT: No nasal bleeding or discharge.  Mucous membranes pink and moist.


NECK: Trachea midline. No JVD. 


CARDIOVASCULAR: Tachycardia to about 110.  Regular rhythm.


RESPIRATORY: Minimal wheezing bilaterally.  Minimal rhonchi bilaterally.


GASTROINTESTINAL: Abdomen soft, non-tender, nondistended. Hepatic and splenic 

margins not palpable. 


MUSCULOSKELETAL: Atrophic changes of the bilateral lower extremities.  Minimal 

atrophy of the upper extremities.


NEUROLOGICAL: AO 3.  Cranial nerve III through XII normal.  Speech memory and 

mentation normal.


Laboratory





Laboratory Tests








Test


  1/9/18


13:15 1/9/18


13:25 1/9/18


13:27 1/9/18


16:10


 


White Blood Count 14.0    


 


Red Blood Count 4.94    


 


Hemoglobin 14.5    


 


Hematocrit 42.8    


 


Mean Corpuscular Volume 86.6    


 


Mean Corpuscular Hemoglobin 29.3    


 


Mean Corpuscular Hemoglobin


Concent 33.8 


  


  


  


 


 


Red Cell Distribution Width 13.4    


 


Platelet Count 393    


 


Mean Platelet Volume 8.5    


 


Neutrophils (%) (Auto) 74.1    


 


Lymphocytes (%) (Auto) 19.9    


 


Monocytes (%) (Auto) 5.2    


 


Eosinophils (%) (Auto) 0.4    


 


Basophils (%) (Auto) 0.4    


 


Neutrophils # (Auto) 10.4    


 


Lymphocytes # (Auto) 2.8    


 


Monocytes # (Auto) 0.7    


 


Eosinophils # (Auto) 0.1    


 


Basophils # (Auto) 0.1    


 


CBC Comment DIFF FINAL    


 


Differential Comment     


 


Blood Urea Nitrogen 7    


 


Creatinine 0.67    


 


Random Glucose 82    


 


Total Protein 8.4    


 


Albumin 3.6    


 


Calcium Level 9.8    


 


Magnesium Level 2.2    


 


Alkaline Phosphatase 99    


 


Aspartate Amino Transf


(AST/SGOT) 23 


  


  


  


 


 


Alanine Aminotransferase


(ALT/SGPT) 18 


  


  


  


 


 


Total Bilirubin 0.3    


 


Sodium Level 133    


 


Potassium Level 4.0    


 


Chloride Level 98    


 


Carbon Dioxide Level 27.5    


 


Anion Gap 8    


 


Estimat Glomerular Filtration


Rate 91 


  


  


  


 


 


Lactic Acid Level 3.0    1.4 


 


Total Creatine Kinase 90    


 


Lipase 95    


 


Urine Color  YELLOW   


 


Urine Turbidity  CLOUDY   


 


Urine pH  7.5   


 


Urine Specific Gravity  1.013   


 


Urine Protein  30   


 


Urine Glucose (UA)  NEG   


 


Urine Ketones  NEG   


 


Urine Occult Blood  SMALL   


 


Urine Nitrite  NEG   


 


Urine Bilirubin  NEG   


 


Urine Urobilinogen  LESS THAN 2.0   


 


Urine Leukocyte Esterase  LARGE   


 


Urine RBC  8   


 


Urine WBC  112   


 


Urine Amorphous Sediment  OCC   


 


Urine Bacteria  MANY   


 


Urine Mucus  MANY   


 


Microscopic Urinalysis Comment


  


  CATH-CULTURE


IND 


  


 


 


Blood Gas Puncture Site   RT RADIAL  


 


Blood Gas Patient Temperature   98.6  


 


Blood Gas HCO3   25  


 


Blood Gas Base Excess   1.6  


 


Blood Gas Oxygen Saturation   91  


 


Arterial Blood pH   7.44  


 


Arterial Blood Partial


Pressure CO2 


  


  38 


  


 


 


Arterial Blood Partial


Pressure O2 


  


  71 


  


 


 


Arterial Blood Oxygen Content   18.0  


 


Arterial Blood


Carboxyhemoglobin 


  


  2.3 


  


 


 


Arterial Blood Methemoglobin   0.8  


 


Blood Gas Hemoglobin   14.1  


 


Oxygen Delivery Device   NRM  


 


Blood Gas Liter Flow   15  


 


Test


  1/9/18


21:42 1/9/18


21:45 


  


 


 


Total Creatine Kinase 67    


 


Troponin I LESS THAN 0.02    














 Date/Time


Source Procedure


Growth Status


 


 


 1/9/18 13:15


Blood Peripheral Aerobic Blood Culture


Pending Received


 


 1/9/18 13:15


Blood Peripheral Anaerobic Blood Culture


Pending Received


 


 1/9/18 13:30


Nasal Aspirate Influenza Types A,B Antigen (SHANNON) - Final


NEGATIVE FOR FLU A AND B ANTIGEN.... Complete


 


 1/9/18 13:25


Urine Catheterized Urine Urine Culture


Pending Received








Result Diagram:  


1/9/18 1315                                                                    

            1/9/18 1315





Imaging





Last 24 hours Impressions








Chest X-Ray 1/9/18 1305 Signed





Impressions: 





 Service Date/Time:  Tuesday, January 9, 2018 13:17 - CONCLUSION:  1. Minimal 





 bibasilar atelectasis/scarring.     Kedar Salinas MD 











Septic Shock Reassessment


Septic shock perfusion:  reassessment completed





Assessment and Plan


Assessment and Plan


Acute respiratory failure with hypoxia


-  CXR reviewed unremarkable


- CT PE without evidence of pulmonary embolism however with some groundglass 

opacity in posterior lung bases


- Pneumonia


- Change ceftriaxone to cefepime at the patient with history of smoking 

underlying structural lung disease to cover for Pseudomonas


- Continue O2 to keep sats above 92


- BiPAP when necessary


- Follow-up cultures and sensitivity


- Follow-up urine antigens





COPD


- DuoNeb scheduled and when necessary


- IV steroid 





Paraplegia 


- after neck abscess in 2010


- Lovenox DVT prophylaxis


- PT and OT eval and treat as tolerated





DVT GI prophylaxis


- Teds SCDs


- Lovenox


- Pepcid


- Regular diet





Critical Care:


The total critical care time was 35 minutes. Time to perform other separately 

billable procedures was not included in the critical care time.











Harsha Cruz MD Jan 9, 2018 11:38 pm

## 2018-01-09 NOTE — RADRPT
EXAM DATE/TIME:  01/09/2018 16:55 

 

[facility] COMPARISON:     

No previous studies available for comparison.

 

 

INDICATIONS :     

Shortness of breath for eighteen days

                      

 

IV CONTRAST:     

54 cc Omnipaque 350 (iohexol) IV 

 

 

RADIATION DOSE:     

8.21 CTDIvol (mGy) 

 

 

MEDICAL HISTORY :     

Hypertension.  Hepatitis

 

SURGICAL HISTORY :      

Tubal ligation. 

 

ENCOUNTER:      

Initial

 

ACUITY:      

3 weeks

 

PAIN SCALE:      

8/10

 

LOCATION:        

chest 

 

TECHNIQUE:     

Volumetric scanning of the chest was performed using a pulmonary embolism protocol MIP images were re
constructed.  Using automated exposure control and adjustment of the mA and/or kV according to patien
t size, radiation dose was kept as low as reasonably achievable to obtain optimal diagnostic quality 
images.   DICOM format image data is available electronically for review and comparison.  

 

Follow-up recommendations for detected pulmonary nodules are based at a minimum on nodule size and pa
tient risk factors according to Fleischner Society Guidelines.

 

FINDINGS:     

 

PULMONARY ARTERIES:

No filling defects are seen in the pulmonary arteries through the segmental level.

 

LUNGS:     

There is no pneumothorax . There are patchy groundglass opacities in both lungs. There is mild consol
idation in both posterior lung bases.  No concerning pulmonary nodule is visualized.

 

PLEURAE:     

There is no pleural thickening or pleural effusion.

 

MEDIASTINUM:     

There is good visualization of the great vessels of the middle mediastinum.  No evidence of mediastin
al or hilar adenopathy/mass.

 

MUSCULOSKELETAL:     

Within normal limits for patient age.

 

MISCELLANEOUS:     

The visualized upper abdominal organs demonstrate no acute abnormality.

 

CONCLUSION:     

1. No evidence of pulmonary embolism.

2. Scattered groundglass opacities in both lungs which are nonspecific. The differential diagnosis in
cludes pneumonia and interstitial lung disease.

3. Mild consolidation in both posterior lung bases.

 

 

 

 

 Yamil Antunez MD on January 09, 2018 at 17:28           

Board Certified Radiologist.

 This report was verified electronically.

## 2018-01-09 NOTE — PD
HPI


Chief Complaint:  Respiratory Distress


Time Seen by Provider:  13:05


Travel History


International Travel<30 days:  No


Contact w/Intl Traveler<30days:  No


Traveled to known affect area:  No





History of Present Illness


HPI


The patient's 53 years old and arrives to the ER complaining of shortness of 

breath and cough for approximately 2-1/2 weeks.  She suffers from the paralysis 

from the waist down and has weakness in the upper extremities.  She reports the 

coughing for over 2 weeks as cause chest pain and abdominal pain.  EMS notes 

subjective fever on scene and cloudy urine.  The patient reports that her urine 

collected and her Ott to leg bag is much more opaque than normal.  No 

vomiting.  Moderate severity.





PFSH


Past Medical History


Arthritis:  No


Asthma:  No


Autoimmune Disease:  No


Blood Disorders:  No


Anxiety:  No


Depression:  No


Heart Rhythm Problems:  No


Cancer:  No


Cardiovascular Problems:  Yes (HYPOTENSION)


Chemotherapy:  No


Chest Pain:  No


Congestive Heart Failure:  No


COPD:  No


Cerebrovascular Accident:  No


Diabetes:  No


Diminished Hearing:  No


Endocrine:  No


GERD:  No


Glaucoma:  No


Genitourinary:  Yes (UTI)


Headaches:  No


Hepatitis:  Yes


Hiatal Hernia:  No


Hypertension:  No


Immune Disorder:  No


Kidney Stones:  No


Musculoskeletal:  Yes


Neurologic:  Yes (PARAPLEGIC)


Psychiatric:  No


Reproductive:  No


Respiratory:  Yes (TB ; PE 11/10)


Immunizations Current:  Yes


Migraines:  No


Myocardial Infarction:  No


Radiation Therapy:  No


Renal Failure:  No


Seizures:  No


Sickle Cell Disease:  No


Sleep Apnea:  No


Thyroid Disease:  No


Ulcer:  No


PNEUMOCCOCAL Vaccine (Year):  1


Pregnant?:  Not Pregnant


:  4


Para:  3


Miscarriage:  1


Ovarian Cysts:  No


Tubal Ligation:  Yes





Past Surgical History


Abdominal Surgery:  No


AICD:  No


Appendectomy:  No


Arteriovenous Shunt:  No


Cardiac Surgery:  No


Cholecystectomy:  No


Ear Surgery:  No


Endocrine Surgery:  No


Eye Surgery:  No


Genitourinary Surgery:  No


Gynecologic Surgery:  Yes


Insulin Pump:  No


Joint Replacement:  No


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


Oral Surgery:  No


Pacemaker:  No


Thoracic Surgery:  No


Other Surgery:  Yes (MMESH TO PREVENT CLOTS)





Social History


Alcohol Use:  No


Tobacco Use:  Yes ( PPD)


Substance Use:  No





Allergies-Medications


(Allergen,Severity, Reaction):  


Coded Allergies:  


     Sulfa (Sulfonamide Antibiotics) (Unverified  Allergy, Intermediate, 

blisters, 18)


     levofloxacin (Unverified  Allergy, Intermediate, BLISTERS, 18)


     warfarin (Unverified  Adverse Reaction, Intermediate, UNABLE TO REGULATE, )


Reported Meds & Prescriptions





Reported Meds & Active Scripts


Active


Reported


Lyrica (Pregabalin) 50 Mg Cap 50 Mg PO BID


Xanax (Alprazolam) 1 Mg Tab 1 Mg PO Q8H PRN


Baclofen 20 Mg Tab 20 Mg PO TID


Effexor XR 24 HR (Venlafaxine HCl) 150 Mg Cap 150 Mg PO DAILY








Review of Systems


Except as stated in HPI:  all other systems reviewed are Neg


General / Constitutional:  Positive: Fever (subjective fever reported)


Respiratory:  Positive: Cough





Physical Exam


Narrative


GENERAL: 53-year-old female pain O 3


SKIN: Focused skin assessment warm/dry.


HEAD: Atraumatic. Normocephalic. 


EYES: Pupils equal and round. No scleral icterus. No injection or drainage. 


ENT: No nasal bleeding or discharge.  Mucous membranes pink and moist.


NECK: Trachea midline. No JVD. 


CARDIOVASCULAR: Tachycardia to about 110.  Regular rhythm.


RESPIRATORY: Minimal wheezing bilaterally.  Minimal rhonchi bilaterally.


GASTROINTESTINAL: Abdomen soft, non-tender, nondistended. Hepatic and splenic 

margins not palpable. 


MUSCULOSKELETAL: Atrophic changes of the bilateral lower extremities.  Minimal 

atrophy of the upper extremities.


NEUROLOGICAL: AO 3.  Cranial nerve III through XII normal.  Speech memory and 

mentation normal.


PSYCHIATRIC: Appropriate mood and affect; insight and judgment normal.





Data


Data


Last Documented VS





Vital Signs








  Date Time  Temp Pulse Resp B/P (MAP) Pulse Ox O2 Delivery O2 Flow Rate FiO2


 


18 14:22     97 Non-Rebreather 15.00 


 


18 13:04 98.3 106 24 121/75 (90)    





VS reviewed


Orders





 Orders


Sepsis Workup Initiated (18 )


Electrocardiogram (18 13:05)


Complete Blood Count With Diff (18 13:05)


Comprehensive Metabolic Panel (18 13:05)


Lactic Acid Sepsis Protocol (18 13:05)


Magnesium (Mg) (18 13:05)


Lipase (18 13:05)


Ckmb (Isoenzyme) Profile (18 13:05)


Urinalysis - C+S If Indicated (18 13:05)


Influenzae A/B Antigen (18 13:05)


Blood Culture (18 13:05)


Chest, Single Ap (18 13:05)


Arterial Blood Gas (Abg) (18 13:05)


Blood Glucose (18 13:05)


Ecg Monitoring (18 13:05)


Iv Access Insert/Monitor (18 13:05)


Oximetry (18 13:05)


Oxygen Administration (18 13:05)


Acetaminophen (Tylenol) (18 13:15)


Vancomycin Inj (Vancomycin Inj) (18 13:05)


Cefepime Inj (Maxipime Inj) (18 13:05)


Ketorolac Inj (Toradol Inj) (18 14:00)


Methylprednisolone So Succ Inj (Solumedr (18 14:00)


Albuterol-Ipratropium Neb (Duoneb Neb) (18 14:00)


Urine Culture (18 13:25)


Oxycodone-Acetamin  Mg (Percocet 1 (18 15:00)


Admit Order (Ed Use Only) (18 )


Cardiac Monitor / Telemetry EDITH.Q8H (18 15:00)


Vital Signs (Adult) Q4H (18 15:00)


Activity Bed Rest (18 15:00)


Activity Oob With Assistance (18 15:00)





Labs





Laboratory Tests








Test


  18


13:15 18


13:25 18


13:27


 


White Blood Count 14.0 TH/MM3   


 


Red Blood Count 4.94 MIL/MM3   


 


Hemoglobin 14.5 GM/DL   


 


Hematocrit 42.8 %   


 


Mean Corpuscular Volume 86.6 FL   


 


Mean Corpuscular Hemoglobin 29.3 PG   


 


Mean Corpuscular Hemoglobin


Concent 33.8 % 


  


  


 


 


Red Cell Distribution Width 13.4 %   


 


Platelet Count 393 TH/MM3   


 


Mean Platelet Volume 8.5 FL   


 


Neutrophils (%) (Auto) 74.1 %   


 


Lymphocytes (%) (Auto) 19.9 %   


 


Monocytes (%) (Auto) 5.2 %   


 


Eosinophils (%) (Auto) 0.4 %   


 


Basophils (%) (Auto) 0.4 %   


 


Neutrophils # (Auto) 10.4 TH/MM3   


 


Lymphocytes # (Auto) 2.8 TH/MM3   


 


Monocytes # (Auto) 0.7 TH/MM3   


 


Eosinophils # (Auto) 0.1 TH/MM3   


 


Basophils # (Auto) 0.1 TH/MM3   


 


CBC Comment DIFF FINAL   


 


Differential Comment    


 


Blood Urea Nitrogen 7 MG/DL   


 


Creatinine 0.67 MG/DL   


 


Random Glucose 82 MG/DL   


 


Total Protein 8.4 GM/DL   


 


Albumin 3.6 GM/DL   


 


Calcium Level 9.8 MG/DL   


 


Magnesium Level 2.2 MG/DL   


 


Alkaline Phosphatase 99 U/L   


 


Aspartate Amino Transf


(AST/SGOT) 23 U/L 


  


  


 


 


Alanine Aminotransferase


(ALT/SGPT) 18 U/L 


  


  


 


 


Total Bilirubin 0.3 MG/DL   


 


Sodium Level 133 MEQ/L   


 


Potassium Level 4.0 MEQ/L   


 


Chloride Level 98 MEQ/L   


 


Carbon Dioxide Level 27.5 MEQ/L   


 


Anion Gap 8 MEQ/L   


 


Estimat Glomerular Filtration


Rate 91 ML/MIN 


  


  


 


 


Lactic Acid Level 3.0 mmol/L   


 


Total Creatine Kinase 90 U/L   


 


Lipase 95 U/L   


 


Urine Color  YELLOW  


 


Urine Turbidity  CLOUDY  


 


Urine pH  7.5  


 


Urine Specific Gravity  1.013  


 


Urine Protein  30 mg/dL  


 


Urine Glucose (UA)  NEG mg/dL  


 


Urine Ketones  NEG mg/dL  


 


Urine Occult Blood  SMALL  


 


Urine Nitrite  NEG  


 


Urine Bilirubin  NEG  


 


Urine Urobilinogen


  


  LESS THAN 2.0


MG/DL 


 


 


Urine Leukocyte Esterase  LARGE  


 


Urine RBC  8 /hpf  


 


Urine WBC  112 /hpf  


 


Urine Amorphous Sediment  OCC  


 


Urine Bacteria  MANY /hpf  


 


Urine Mucus  MANY /lpf  


 


Microscopic Urinalysis Comment


  


  CATH-CULTURE


IND 


 


 


Blood Gas Puncture Site   RT RADIAL 


 


Blood Gas Patient Temperature   98.6 


 


Blood Gas HCO3   25 mmol/L 


 


Blood Gas Base Excess   1.6 mmol/L 


 


Blood Gas Oxygen Saturation   91 % 


 


Arterial Blood pH   7.44 


 


Arterial Blood Partial


Pressure CO2 


  


  38 mmHg 


 


 


Arterial Blood Partial


Pressure O2 


  


  71 mmHG 


 


 


Arterial Blood Oxygen Content   18.0 Vol % 


 


Arterial Blood


Carboxyhemoglobin 


  


  2.3 % 


 


 


Arterial Blood Methemoglobin   0.8 % 


 


Blood Gas Hemoglobin   14.1 G/DL 


 


Oxygen Delivery Device   NRM 


 


Blood Gas Liter Flow   15 L/M 











MDM


Medical Decision Making


Medical Screen Exam Complete:  Yes


Emergency Medical Condition:  Yes


Medical Record Reviewed:  Yes


Differential Diagnosis


Sepsis, pneumonia, cellulitis, UTI, metabolic disarray


Narrative Course


CBC & BMP Diagram


18 13:15








Total Protein 8.4 H, Albumin 3.6, Calcium Level 9.8, Magnesium Level 2.2, 

Alkaline Phosphatase 99, Aspartate Amino Transf (AST/SGOT) 23, Alanine 

Aminotransferase (ALT/SGPT) 18, Total Bilirubin 0.3





LA 3.0


Lipase 95


UA: UTI present





Last Impressions








Chest X-Ray 18 1305 Signed





Impressions: 





 Service Date/Time:  2018 13:17 - CONCLUSION:  1. Minimal 





 bibasilar atelectasis/scarring.     Kedar Salinas MD 





The patient has dyspnea and hypoxiato the mid 80s on room air.  There is coarse 

rhonchi bilaterally.  Minimal density in the right lung could be consistent 

with infectious process.  UTI also noted.  DuoNeb started along with 

methylprednisolone.


Cefepime and vancomycin started.


Acute on chronic pain due to coughing, routine oral Percocet administered.


Admission for IV antibiotics and oxygen and continuous monitoring.


d/w Dr Waters for [facility]


d/w Dr Ball for [facility]





Diagnosis





 Primary Impression:  


 UTI (urinary tract infection)


 Qualified Codes:  T83.511A - Infection and inflammatory reaction due to 

indwelling urethral catheter, initial encounter; N39.0 - Urinary tract infection

, site not specified


 Additional Impressions:  


 Hypoxia


 Cough





Admitting Information


Admitting Physician Requests:  Admit











Quentin Peraza MD 2018 13:57

## 2018-01-09 NOTE — RADRPT
EXAM DATE/TIME:  01/09/2018 13:17 

 

[facility] COMPARISON:     

CT ABDOMEN & PELVIS W CONTRAST, October 14, 2017, 18:05.

 

                     

INDICATIONS :     

Short of breath, fever

                     

 

MEDICAL HISTORY :     

Cardiovascular disease.       paraplegia   

 

SURGICAL HISTORY :     

Tubal ligation.   cervical spine

 

ENCOUNTER:     

Initial                                        

 

ACUITY:     

1 day      

 

PAIN SCORE:     

0/10

 

LOCATION:     

Bilateral chest 

 

FINDINGS:     

Minimal linear parenchymal opacities at the lung bases consistent with atelectasis/scarring and simil
ar to prior CT exam. Cardiomediastinal contours are within normal limits. Lower cervical fixation bryon
dware in place. Osseous structures are intact.

 

CONCLUSION:     

1. Minimal bibasilar atelectasis/scarring.

 

 

 

 Kedar Salinas MD on January 09, 2018 at 13:42           

Board Certified Radiologist.

 This report was verified electronically.

## 2018-01-10 VITALS
TEMPERATURE: 98 F | SYSTOLIC BLOOD PRESSURE: 121 MMHG | OXYGEN SATURATION: 100 % | RESPIRATION RATE: 68 BRPM | HEART RATE: 69 BPM | DIASTOLIC BLOOD PRESSURE: 78 MMHG

## 2018-01-10 VITALS
TEMPERATURE: 98.3 F | HEART RATE: 122 BPM | DIASTOLIC BLOOD PRESSURE: 57 MMHG | OXYGEN SATURATION: 93 % | SYSTOLIC BLOOD PRESSURE: 100 MMHG | RESPIRATION RATE: 26 BRPM

## 2018-01-10 VITALS
RESPIRATION RATE: 38 BRPM | SYSTOLIC BLOOD PRESSURE: 115 MMHG | DIASTOLIC BLOOD PRESSURE: 88 MMHG | OXYGEN SATURATION: 88 % | TEMPERATURE: 97.8 F | HEART RATE: 122 BPM

## 2018-01-10 VITALS
HEART RATE: 82 BPM | RESPIRATION RATE: 45 BRPM | DIASTOLIC BLOOD PRESSURE: 62 MMHG | OXYGEN SATURATION: 98 % | TEMPERATURE: 98.1 F | SYSTOLIC BLOOD PRESSURE: 99 MMHG

## 2018-01-10 VITALS
HEART RATE: 111 BPM | OXYGEN SATURATION: 98 % | TEMPERATURE: 97.3 F | SYSTOLIC BLOOD PRESSURE: 107 MMHG | RESPIRATION RATE: 51 BRPM | DIASTOLIC BLOOD PRESSURE: 71 MMHG

## 2018-01-10 VITALS
RESPIRATION RATE: 46 BRPM | HEART RATE: 104 BPM | SYSTOLIC BLOOD PRESSURE: 138 MMHG | DIASTOLIC BLOOD PRESSURE: 87 MMHG | OXYGEN SATURATION: 95 % | TEMPERATURE: 97.8 F

## 2018-01-10 VITALS — HEART RATE: 97 BPM

## 2018-01-10 VITALS — HEART RATE: 80 BPM

## 2018-01-10 VITALS — OXYGEN SATURATION: 93 %

## 2018-01-10 VITALS — HEART RATE: 124 BPM

## 2018-01-10 VITALS — HEART RATE: 75 BPM

## 2018-01-10 VITALS — OXYGEN SATURATION: 97 %

## 2018-01-10 VITALS — HEART RATE: 112 BPM

## 2018-01-10 VITALS — HEART RATE: 125 BPM

## 2018-01-10 VITALS — OXYGEN SATURATION: 96 %

## 2018-01-10 VITALS — HEART RATE: 113 BPM

## 2018-01-10 LAB
BASOPHILS # BLD AUTO: 0 TH/MM3 (ref 0–0.2)
BASOPHILS NFR BLD: 0.1 % (ref 0–2)
BUN SERPL-MCNC: 14 MG/DL (ref 7–18)
CALCIUM SERPL-MCNC: 8.9 MG/DL (ref 8.5–10.1)
CHLORIDE SERPL-SCNC: 101 MEQ/L (ref 98–107)
CREAT SERPL-MCNC: 0.71 MG/DL (ref 0.5–1)
EOSINOPHIL # BLD: 0 TH/MM3 (ref 0–0.4)
EOSINOPHIL NFR BLD: 0 % (ref 0–4)
ERYTHROCYTE [DISTWIDTH] IN BLOOD BY AUTOMATED COUNT: 13.6 % (ref 11.6–17.2)
GFR SERPLBLD BASED ON 1.73 SQ M-ARVRAT: 85 ML/MIN (ref 89–?)
GLUCOSE SERPL-MCNC: 166 MG/DL (ref 74–106)
HCO3 BLD-SCNC: 25.3 MEQ/L (ref 21–32)
HCT VFR BLD CALC: 39.7 % (ref 35–46)
HGB BLD-MCNC: 13.1 GM/DL (ref 11.6–15.3)
LYMPHOCYTES # BLD AUTO: 0.8 TH/MM3 (ref 1–4.8)
LYMPHOCYTES NFR BLD AUTO: 5.5 % (ref 9–44)
MCH RBC QN AUTO: 28.7 PG (ref 27–34)
MCHC RBC AUTO-ENTMCNC: 33 % (ref 32–36)
MCV RBC AUTO: 87 FL (ref 80–100)
MONOCYTE #: 0.2 TH/MM3 (ref 0–0.9)
MONOCYTES NFR BLD: 1.1 % (ref 0–8)
NEUTROPHILS # BLD AUTO: 13.8 TH/MM3 (ref 1.8–7.7)
NEUTROPHILS NFR BLD AUTO: 93.3 % (ref 16–70)
PLATELET # BLD: 377 TH/MM3 (ref 150–450)
PMV BLD AUTO: 8.4 FL (ref 7–11)
RBC # BLD AUTO: 4.57 MIL/MM3 (ref 4–5.3)
SODIUM SERPL-SCNC: 135 MEQ/L (ref 136–145)
TROPONIN I SERPL-MCNC: (no result) NG/ML (ref 0.02–0.05)
TROPONIN I SERPL-MCNC: (no result) NG/ML (ref 0.02–0.05)
WBC # BLD AUTO: 14.8 TH/MM3 (ref 4–11)

## 2018-01-10 RX ADMIN — CEFEPIME SCH MLS/HR: 2 INJECTION, POWDER, FOR SOLUTION INTRAVENOUS at 10:49

## 2018-01-10 RX ADMIN — MORPHINE SULFATE PRN MG: 2 INJECTION, SOLUTION INTRAMUSCULAR; INTRAVENOUS at 05:08

## 2018-01-10 RX ADMIN — Medication SCH ML: at 08:00

## 2018-01-10 RX ADMIN — MORPHINE SULFATE PRN MG: 2 INJECTION, SOLUTION INTRAMUSCULAR; INTRAVENOUS at 10:49

## 2018-01-10 RX ADMIN — IPRATROPIUM BROMIDE AND ALBUTEROL SULFATE SCH AMPULE: .5; 3 SOLUTION RESPIRATORY (INHALATION) at 20:19

## 2018-01-10 RX ADMIN — IPRATROPIUM BROMIDE AND ALBUTEROL SULFATE SCH AMPULE: .5; 3 SOLUTION RESPIRATORY (INHALATION) at 11:55

## 2018-01-10 RX ADMIN — METHYLPREDNISOLONE SODIUM SUCCINATE SCH MG: 40 INJECTION, POWDER, FOR SOLUTION INTRAMUSCULAR; INTRAVENOUS at 08:00

## 2018-01-10 RX ADMIN — CEFEPIME SCH MLS/HR: 2 INJECTION, POWDER, FOR SOLUTION INTRAVENOUS at 01:15

## 2018-01-10 RX ADMIN — IPRATROPIUM BROMIDE AND ALBUTEROL SULFATE SCH AMPULE: .5; 3 SOLUTION RESPIRATORY (INHALATION) at 15:42

## 2018-01-10 RX ADMIN — FAMOTIDINE SCH MG: 10 INJECTION, SOLUTION INTRAVENOUS at 05:07

## 2018-01-10 RX ADMIN — METHYLPREDNISOLONE SODIUM SUCCINATE SCH MG: 40 INJECTION, POWDER, FOR SOLUTION INTRAMUSCULAR; INTRAVENOUS at 14:09

## 2018-01-10 RX ADMIN — MORPHINE SULFATE PRN MG: 2 INJECTION, SOLUTION INTRAMUSCULAR; INTRAVENOUS at 17:05

## 2018-01-10 RX ADMIN — Medication SCH ML: at 20:28

## 2018-01-10 RX ADMIN — AZITHROMYCIN SCH MLS/HR: 500 INJECTION, POWDER, LYOPHILIZED, FOR SOLUTION INTRAVENOUS at 08:00

## 2018-01-10 RX ADMIN — MORPHINE SULFATE PRN MG: 2 INJECTION, SOLUTION INTRAMUSCULAR; INTRAVENOUS at 20:28

## 2018-01-10 RX ADMIN — FAMOTIDINE SCH MG: 10 INJECTION, SOLUTION INTRAVENOUS at 16:36

## 2018-01-10 RX ADMIN — METHYLPREDNISOLONE SODIUM SUCCINATE SCH MG: 40 INJECTION, POWDER, FOR SOLUTION INTRAMUSCULAR; INTRAVENOUS at 20:28

## 2018-01-10 RX ADMIN — ENOXAPARIN SODIUM SCH MG: 40 INJECTION SUBCUTANEOUS at 14:08

## 2018-01-10 RX ADMIN — IPRATROPIUM BROMIDE AND ALBUTEROL SULFATE SCH AMPULE: .5; 3 SOLUTION RESPIRATORY (INHALATION) at 08:19

## 2018-01-10 RX ADMIN — CHLORHEXIDINE GLUCONATE SCH PACK: 500 CLOTH TOPICAL at 04:00

## 2018-01-10 RX ADMIN — MORPHINE SULFATE PRN MG: 2 INJECTION, SOLUTION INTRAMUSCULAR; INTRAVENOUS at 02:33

## 2018-01-10 RX ADMIN — MORPHINE SULFATE PRN MG: 2 INJECTION, SOLUTION INTRAMUSCULAR; INTRAVENOUS at 14:00

## 2018-01-10 RX ADMIN — NICOTINE SCH PATCH: 14 PATCH, EXTENDED RELEASE TOPICAL at 16:36

## 2018-01-10 RX ADMIN — MORPHINE SULFATE PRN MG: 2 INJECTION, SOLUTION INTRAMUSCULAR; INTRAVENOUS at 08:00

## 2018-01-10 RX ADMIN — CEFEPIME SCH MLS/HR: 2 INJECTION, POWDER, FOR SOLUTION INTRAVENOUS at 16:36

## 2018-01-10 RX ADMIN — METHYLPREDNISOLONE SODIUM SUCCINATE SCH MG: 40 INJECTION, POWDER, FOR SOLUTION INTRAMUSCULAR; INTRAVENOUS at 02:33

## 2018-01-10 NOTE — EKG
Date Performed: 01/09/2018       Time Performed: 14:01:17

 

PTAGE:      53 years

 

EKG:      Sinus rhythm 

 

 INDETERMINATE AXIS POSSIBLE INFERIOR MYOCARDIAL INFARCTION BORDERLINE ECG

 

PREVIOUS TRACING       : 10/21/2012 17.45 Compared to prior tracing no significant change

 

DOCTOR:   Eliza Sanford  Interpretating Date/Time  01/10/2018 22:08:58

## 2018-01-10 NOTE — EKG
Date Performed: 01/10/2018       Time Performed: 05:21:38

 

PTAGE:      53 years

 

EKG:      Sinus rhythm 

 

. Possible inferior infarct - age undetermined Abnormal ECG Compared to prior tracing no significant 
change

 

DOCTOR:   Eliza Sanford  Interpretating Date/Time  01/10/2018 21:13:08

## 2018-01-10 NOTE — HHI.CCPN
Subjective


Remarks/Hospital Course





53 year old female with history of paraplegia, TB (1986), PE (2010), neck 

abscess who presented to the ED on 1/9/2018 due to significant cough, shortness 

of breath that started about 10 days prior to this admission. She reports cough 

production and due to coughing fits, she has chest pain, abdominal pain. Per EMS

, her urine appeared to be cloudy too. Patient has chronic Ott catheter. 

Additionally she reports not able to sleep or eat much. Today she noted speck 

of blood in the sputum as well. In the ED, she was severely hypoxic and 

required non-rebreather to maintain O2 sat > 90%. Her HR was 106, Resp 24, Temp 

98.3 and /75.  She was admitted to ICU on hospitalist service, however 

developed worsening respiratory failure and critical care medicine was 

consulted for further management.


Subjective:


1/10: No acute events. The patient has been weaned to an FiO2 of 5 L/m nasal 

cannula from nonrebreather.  Patient complains of chest pain with violent 

coughing.  Patient tolerating diet.Urine antigen positive for strep pneumoniae





Objective





Vital Signs








  Date Time  Temp Pulse Resp B/P (MAP) Pulse Ox O2 Delivery O2 Flow Rate FiO2


 


1/10/18 14:00  124      


 


1/10/18 12:00 97.3  51 107/71 (83) 98   


 


1/10/18 08:20      Nasal Cannula 6.00 


 


1/10/18 01:01        40














Intake and Output   


 


 1/10/18 1/10/18 1/11/18





 08:00 16:00 00:00


 


Output Total 600 ml  


 


Balance -600 ml  








Result Diagram:  


1/10/18 0515                                                                   

             1/10/18 0515





Other Results





Microbiology








 Date/Time


Source Procedure


Growth Status


 


 


 1/9/18 13:30


Nasal Aspirate Influenza Types A,B Antigen (SHANNON) - Final


NEGATIVE FOR FLU A AND B ANTIGEN.... Complete





 1/9/18 13:25


Urine Random Urine Legionella Antigen - Final


PRESUMPTIVE NEGATIVE FOR LEGIONELLA P... Complete


 


 1/9/18 13:25 Streptococcus pneumoniae Antigen (M - Final


Pos S.pneumoniae Antigen Complete








Imaging





Last 24 hours Impressions








Chest X-Ray 1/9/18 1305 Signed





Impressions: 





 Service Date/Time:  Tuesday, January 9, 2018 13:17 - CONCLUSION:  1. Minimal 





 bibasilar atelectasis/scarring.     Kedar Bozorgmanesh, MD 








Objective Remarks


GENERAL: 53-year-old female paraplegic with c/o chest pain from coughing in 

moderate distress


SKIN: Focused skin assessment warm/dry.


HEAD: Atraumatic. Normocephalic. 


EYES: Pupils equal and round. No scleral icterus. No injection or drainage. 


ENT: No nasal bleeding or discharge.  Mucous membranes pink and moist.


NECK: Trachea midline. No JVD. 


CARDIOVASCULAR: Tachycardia to about 120's.  Regular rhythm.


RESPIRATORY: Minimal wheezing bilaterally.  Minimal rhonchi bilaterally.


GASTROINTESTINAL: Abdomen soft, non-tender, nondistended. Hepatic and splenic 

margins not palpable. 


MUSCULOSKELETAL: Atrophic changes of the bilateral lower extremities.  Minimal 

atrophy of the upper extremities.


NEUROLOGICAL: GCS 15 AO 3.  Cranial nerve III through XII normal.  Speech 

memory and mentation normal.Paraplegic wheelchair-bound





A/P


Assessment and Plan


Acute respiratory failure with hypoxia


-  CXR reviewed unremarkable


- CT PE without evidence of pulmonary embolism however with some groundglass 

opacity in posterior lung bases


- Pneumonia


- Change ceftriaxone to cefepime at the patient with history of smoking 

underlying structural lung disease to cover for Pseudomonas


- Continue O2 to keep sats above 92


- BiPAP when necessary


- Follow-up cultures and sensitivity


- Follow-up urine antigens- positive for Strep Pneumo


Tobacco abuse disorder


- Nicotine patch





COPD


- DuoNeb scheduled and when necessary


- IV steroid 





Paraplegia 


- after neck abscess in 2010


- Lovenox DVT prophylaxis


- PT and OT eval and treat as tolerated





DVT GI prophylaxis


- Teds SCDs


- Lovenox


- Pepcid


- Regular diet





Critical Care:





Level III follow up


Physician


Aga Haque MD Harsha 10, 2018 15:46

## 2018-01-10 NOTE — EKG
Date Performed: 01/09/2018       Time Performed: 21:27:25

 

PTAGE:      53 years

 

EKG:      SINUS TACHYCARDIA INDETERMINATE AXIS LOW QRS VOLTAGE IN PRECORDIAL LEADS PROBABLE INFERIOR 
MYOCARDIAL INFARCTION , OF INDETERMINATE AGE ABNORMAL ECG

 

PREVIOUS TRACING       : 01/09/2018 14.01 Compared to prior tracing no significant change

 

DOCTOR:   Eliza Sanford  Interpretating Date/Time  01/10/2018 21:39:16

## 2018-01-10 NOTE — EKG
Date Performed: 01/10/2018       Time Performed: 09:39:56

 

PTAGE:      53 years

 

EKG:      SINUS TACHYCARDIA POSSIBLE LEFT ATRIAL ENLARGEMENT INCOMPLETE RIGHT BUNDLE BRANCH BLOCK PRO
BABLE INFERIOR MYOCARDIAL INFARCTION , PROBABLY OLD ABNORMAL ECG

 

PREVIOUS TRACING       : 01/10/2018 05.21 Compared to prior tracing no significant change

 

DOCTOR:   Eliza Sanford  Interpretating Date/Time  01/10/2018 20:54:46

## 2018-01-11 VITALS
RESPIRATION RATE: 52 BRPM | HEART RATE: 107 BPM | DIASTOLIC BLOOD PRESSURE: 76 MMHG | TEMPERATURE: 97.9 F | SYSTOLIC BLOOD PRESSURE: 134 MMHG | OXYGEN SATURATION: 90 %

## 2018-01-11 VITALS
TEMPERATURE: 98.4 F | RESPIRATION RATE: 24 BRPM | OXYGEN SATURATION: 92 % | SYSTOLIC BLOOD PRESSURE: 113 MMHG | HEART RATE: 103 BPM | DIASTOLIC BLOOD PRESSURE: 63 MMHG

## 2018-01-11 VITALS — HEART RATE: 93 BPM

## 2018-01-11 VITALS
TEMPERATURE: 99 F | SYSTOLIC BLOOD PRESSURE: 102 MMHG | DIASTOLIC BLOOD PRESSURE: 74 MMHG | RESPIRATION RATE: 16 BRPM | OXYGEN SATURATION: 95 % | HEART RATE: 100 BPM

## 2018-01-11 VITALS
TEMPERATURE: 97.5 F | SYSTOLIC BLOOD PRESSURE: 133 MMHG | RESPIRATION RATE: 27 BRPM | HEART RATE: 76 BPM | DIASTOLIC BLOOD PRESSURE: 75 MMHG | OXYGEN SATURATION: 95 %

## 2018-01-11 VITALS
DIASTOLIC BLOOD PRESSURE: 60 MMHG | OXYGEN SATURATION: 90 % | HEART RATE: 111 BPM | TEMPERATURE: 98.1 F | RESPIRATION RATE: 22 BRPM | SYSTOLIC BLOOD PRESSURE: 110 MMHG

## 2018-01-11 VITALS — HEART RATE: 75 BPM

## 2018-01-11 VITALS — HEART RATE: 100 BPM

## 2018-01-11 VITALS — OXYGEN SATURATION: 97 %

## 2018-01-11 VITALS — OXYGEN SATURATION: 98 %

## 2018-01-11 VITALS
RESPIRATION RATE: 72 BRPM | HEART RATE: 75 BPM | SYSTOLIC BLOOD PRESSURE: 142 MMHG | DIASTOLIC BLOOD PRESSURE: 82 MMHG | OXYGEN SATURATION: 97 % | TEMPERATURE: 98.3 F

## 2018-01-11 VITALS — HEART RATE: 104 BPM

## 2018-01-11 VITALS — HEART RATE: 106 BPM

## 2018-01-11 VITALS — HEART RATE: 96 BPM

## 2018-01-11 LAB
BASOPHILS # BLD AUTO: 0 TH/MM3 (ref 0–0.2)
BASOPHILS NFR BLD: 0.1 % (ref 0–2)
BUN SERPL-MCNC: 15 MG/DL (ref 7–18)
CALCIUM SERPL-MCNC: 8.7 MG/DL (ref 8.5–10.1)
CHLORIDE SERPL-SCNC: 104 MEQ/L (ref 98–107)
CREAT SERPL-MCNC: 0.82 MG/DL (ref 0.5–1)
EOSINOPHIL # BLD: 0 TH/MM3 (ref 0–0.4)
EOSINOPHIL NFR BLD: 0 % (ref 0–4)
ERYTHROCYTE [DISTWIDTH] IN BLOOD BY AUTOMATED COUNT: 13.4 % (ref 11.6–17.2)
GFR SERPLBLD BASED ON 1.73 SQ M-ARVRAT: 72 ML/MIN (ref 89–?)
GLUCOSE SERPL-MCNC: 223 MG/DL (ref 74–106)
HCO3 BLD-SCNC: 24.1 MEQ/L (ref 21–32)
HCT VFR BLD CALC: 35.3 % (ref 35–46)
HGB BLD-MCNC: 11.7 GM/DL (ref 11.6–15.3)
LYMPHOCYTES # BLD AUTO: 0.7 TH/MM3 (ref 1–4.8)
LYMPHOCYTES NFR BLD AUTO: 3.7 % (ref 9–44)
MCH RBC QN AUTO: 28.8 PG (ref 27–34)
MCHC RBC AUTO-ENTMCNC: 33 % (ref 32–36)
MCV RBC AUTO: 87.4 FL (ref 80–100)
MONOCYTE #: 0.4 TH/MM3 (ref 0–0.9)
MONOCYTES NFR BLD: 2.2 % (ref 0–8)
NEUTROPHILS # BLD AUTO: 18.9 TH/MM3 (ref 1.8–7.7)
NEUTROPHILS NFR BLD AUTO: 94 % (ref 16–70)
PLATELET # BLD: 306 TH/MM3 (ref 150–450)
PMV BLD AUTO: 8.4 FL (ref 7–11)
RBC # BLD AUTO: 4.05 MIL/MM3 (ref 4–5.3)
SODIUM SERPL-SCNC: 139 MEQ/L (ref 136–145)
WBC # BLD AUTO: 20.1 TH/MM3 (ref 4–11)

## 2018-01-11 RX ADMIN — IPRATROPIUM BROMIDE AND ALBUTEROL SULFATE SCH AMPULE: .5; 3 SOLUTION RESPIRATORY (INHALATION) at 11:18

## 2018-01-11 RX ADMIN — CEFEPIME SCH MLS/HR: 2 INJECTION, POWDER, FOR SOLUTION INTRAVENOUS at 08:42

## 2018-01-11 RX ADMIN — ENOXAPARIN SODIUM SCH MG: 40 INJECTION SUBCUTANEOUS at 15:05

## 2018-01-11 RX ADMIN — CEFEPIME SCH MLS/HR: 2 INJECTION, POWDER, FOR SOLUTION INTRAVENOUS at 01:04

## 2018-01-11 RX ADMIN — IPRATROPIUM BROMIDE AND ALBUTEROL SULFATE SCH AMPULE: .5; 3 SOLUTION RESPIRATORY (INHALATION) at 14:43

## 2018-01-11 RX ADMIN — OXYCODONE HYDROCHLORIDE AND ACETAMINOPHEN PRN TAB: 7.5; 325 TABLET ORAL at 15:07

## 2018-01-11 RX ADMIN — MORPHINE SULFATE PRN MG: 2 INJECTION, SOLUTION INTRAMUSCULAR; INTRAVENOUS at 20:23

## 2018-01-11 RX ADMIN — NICOTINE SCH PATCH: 14 PATCH, EXTENDED RELEASE TOPICAL at 08:43

## 2018-01-11 RX ADMIN — FAMOTIDINE SCH MG: 10 INJECTION, SOLUTION INTRAVENOUS at 16:44

## 2018-01-11 RX ADMIN — MORPHINE SULFATE PRN MG: 2 INJECTION, SOLUTION INTRAMUSCULAR; INTRAVENOUS at 12:30

## 2018-01-11 RX ADMIN — AZITHROMYCIN SCH MLS/HR: 500 INJECTION, POWDER, LYOPHILIZED, FOR SOLUTION INTRAVENOUS at 08:42

## 2018-01-11 RX ADMIN — METHYLPREDNISOLONE SODIUM SUCCINATE SCH MG: 40 INJECTION, POWDER, FOR SOLUTION INTRAMUSCULAR; INTRAVENOUS at 08:41

## 2018-01-11 RX ADMIN — MORPHINE SULFATE PRN MG: 2 INJECTION, SOLUTION INTRAMUSCULAR; INTRAVENOUS at 15:54

## 2018-01-11 RX ADMIN — FAMOTIDINE SCH MG: 10 INJECTION, SOLUTION INTRAVENOUS at 05:11

## 2018-01-11 RX ADMIN — METHYLPREDNISOLONE SODIUM SUCCINATE SCH MG: 40 INJECTION, POWDER, FOR SOLUTION INTRAMUSCULAR; INTRAVENOUS at 01:04

## 2018-01-11 RX ADMIN — MORPHINE SULFATE PRN MG: 2 INJECTION, SOLUTION INTRAMUSCULAR; INTRAVENOUS at 05:11

## 2018-01-11 RX ADMIN — MORPHINE SULFATE PRN MG: 2 INJECTION, SOLUTION INTRAMUSCULAR; INTRAVENOUS at 08:40

## 2018-01-11 RX ADMIN — Medication SCH ML: at 20:24

## 2018-01-11 RX ADMIN — MORPHINE SULFATE PRN MG: 2 INJECTION, SOLUTION INTRAMUSCULAR; INTRAVENOUS at 01:03

## 2018-01-11 RX ADMIN — IPRATROPIUM BROMIDE AND ALBUTEROL SULFATE SCH AMPULE: .5; 3 SOLUTION RESPIRATORY (INHALATION) at 07:54

## 2018-01-11 RX ADMIN — METHYLPREDNISOLONE SODIUM SUCCINATE SCH MG: 40 INJECTION, POWDER, FOR SOLUTION INTRAMUSCULAR; INTRAVENOUS at 20:23

## 2018-01-11 RX ADMIN — METHYLPREDNISOLONE SODIUM SUCCINATE SCH MG: 40 INJECTION, POWDER, FOR SOLUTION INTRAMUSCULAR; INTRAVENOUS at 12:31

## 2018-01-11 RX ADMIN — IPRATROPIUM BROMIDE AND ALBUTEROL SULFATE SCH AMPULE: .5; 3 SOLUTION RESPIRATORY (INHALATION) at 20:37

## 2018-01-11 RX ADMIN — CEFEPIME SCH MLS/HR: 2 INJECTION, POWDER, FOR SOLUTION INTRAVENOUS at 16:44

## 2018-01-11 RX ADMIN — CHLORHEXIDINE GLUCONATE SCH PACK: 500 CLOTH TOPICAL at 01:04

## 2018-01-11 RX ADMIN — Medication SCH ML: at 08:43

## 2018-01-11 NOTE — HHI.CCPN
Subjective


Remarks/Hospital Course





53 year old female with history of paraplegia, TB (1986), PE (2010), neck 

abscess who presented to the ED on 1/9/2018 due to significant cough, shortness 

of breath that started about 10 days prior to this admission. She reports cough 

production and due to coughing fits, she has chest pain, abdominal pain. Per EMS

, her urine appeared to be cloudy too. Patient has chronic Ott catheter. 

Additionally she reports not able to sleep or eat much. Today she noted speck 

of blood in the sputum as well. In the ED, she was severely hypoxic and 

required non-rebreather to maintain O2 sat > 90%. Her HR was 106, Resp 24, Temp 

98.3 and /75.  She was admitted to ICU on hospitalist service, however 

developed worsening respiratory failure and critical care medicine was 

consulted for further management.


Subjective:


1/10: No acute events. The patient has been weaned to an FiO2 of 5 L/m nasal 

cannula from nonrebreather.  Patient complains of chest pain with violent 

coughing.  Patient tolerating diet.Urine antigen positive for strep pneumoniae.


1/11: Patient complained of pain 7/10 from coughing.  Additional narcotics 

ordered, with morphine IV for breakthrough pain.  Patient continues to have 

leukocytosis, antibiotics continued.  Patient tolerating diet.





Objective





Vital Signs








  Date Time  Temp Pulse Resp B/P (MAP) Pulse Ox O2 Delivery O2 Flow Rate FiO2


 


1/11/18 10:00  96      


 


1/11/18 08:45   16     


 


1/11/18 08:00 99.0   102/74 (83) 95   


 


1/11/18 07:54      Nasal Cannula 4.00 


 


1/10/18 01:01        40














Intake and Output   


 


 1/11/18 1/11/18 1/12/18





 08:00 16:00 00:00


 


Intake Total 340 ml 100 ml 


 


Output Total 1400 ml  


 


Balance -1060 ml 100 ml 








Result Diagram:  


1/10/18 0515                                                                   

             1/10/18 0515





Other Results





Microbiology








 Date/Time


Source Procedure


Growth Status


 


 


 1/9/18 13:30


Nasal Aspirate Influenza Types A,B Antigen (SHANNON) - Final


NEGATIVE FOR FLU A AND B ANTIGEN.... Complete





 1/9/18 13:25


Urine Random Urine Legionella Antigen - Final


PRESUMPTIVE NEGATIVE FOR LEGIONELLA P... Complete


 


 1/9/18 13:25 Streptococcus pneumoniae Antigen (M - Final


Pos S.pneumoniae Antigen Complete








Imaging





Last 24 hours Impressions








Chest X-Ray 1/9/18 1305 Signed





Impressions: 





 Service Date/Time:  Tuesday, January 9, 2018 13:17 - CONCLUSION:  1. Minimal 





 bibasilar atelectasis/scarring.     Kedar Salinas MD 








Objective Remarks


GENERAL: 53-year-old female paraplegic in no acute distress


SKIN: Focused skin assessment warm/dry.


HEAD: Atraumatic. Normocephalic. 


EYES: Pupils equal and round. No scleral icterus. No injection or drainage. 


ENT: No nasal bleeding or discharge.  Mucous membranes pink and moist.


NECK: Trachea midline. No JVD. 


CARDIOVASCULAR:.  Regular rate, Regular rhythm.


RESPIRATORY: Minimal wheezing bilaterally.  Minimal rhonchi bilaterally.


GASTROINTESTINAL: Abdomen soft, non-tender, nondistended. Hepatic and splenic 

margins not palpable. 


MUSCULOSKELETAL: Atrophic changes of the bilateral lower extremities.  Minimal 

atrophy of the upper extremities.


NEUROLOGICAL: GCS 15 AO 3.  Cranial nerve III through XII normal.  Speech 

memory and mentation normal.





A/P


Assessment and Plan


Acute respiratory failure with hypoxia-resolved


-  CXR reviewed unremarkable


- CT PE without evidence of pulmonary embolism however with some groundglass 

opacity in posterior lung bases


- Pneumonia


- Change ceftriaxone to cefepime at the patient with history of smoking 

underlying structural lung disease to cover for Pseudomonas


- Continue O2 to keep sats above 92%


- BiPAP when necessary


- Follow-up cultures and sensitivity


- Follow-up urine antigens- positive for Strep Pneumo





Tobacco abuse disorder


- Nicotine patch





COPD


- DuoNeb scheduled and when necessary


- IV steroid 





Paraplegia 


- after neck abscess in 2010


- Lovenox DVT prophylaxis


- PT and OT eval and treat as tolerated





DVT GI prophylaxis


- Teds SCDs


- Lovenox


- Pepcid


- Regular diet





Critical Care:





Level 2 follow-up.  Transfer to [facility] Hospitalist in a.m.  Transfer to 

[facility] floor when bed available.


Physician


Aga Haque MD Jan 11, 2018 11:04

## 2018-01-12 VITALS
RESPIRATION RATE: 44 BRPM | SYSTOLIC BLOOD PRESSURE: 139 MMHG | TEMPERATURE: 97.9 F | OXYGEN SATURATION: 92 % | DIASTOLIC BLOOD PRESSURE: 88 MMHG | HEART RATE: 99 BPM

## 2018-01-12 VITALS
TEMPERATURE: 97.6 F | SYSTOLIC BLOOD PRESSURE: 168 MMHG | HEART RATE: 65 BPM | OXYGEN SATURATION: 95 % | DIASTOLIC BLOOD PRESSURE: 83 MMHG | RESPIRATION RATE: 45 BRPM

## 2018-01-12 VITALS — HEART RATE: 90 BPM

## 2018-01-12 VITALS
OXYGEN SATURATION: 94 % | RESPIRATION RATE: 37 BRPM | DIASTOLIC BLOOD PRESSURE: 75 MMHG | HEART RATE: 98 BPM | SYSTOLIC BLOOD PRESSURE: 124 MMHG | TEMPERATURE: 98.6 F

## 2018-01-12 VITALS — OXYGEN SATURATION: 98 %

## 2018-01-12 VITALS
TEMPERATURE: 97.8 F | HEART RATE: 107 BPM | RESPIRATION RATE: 40 BRPM | DIASTOLIC BLOOD PRESSURE: 74 MMHG | SYSTOLIC BLOOD PRESSURE: 160 MMHG | OXYGEN SATURATION: 95 %

## 2018-01-12 VITALS — HEART RATE: 69 BPM

## 2018-01-12 VITALS — HEART RATE: 65 BPM

## 2018-01-12 VITALS
TEMPERATURE: 97.9 F | SYSTOLIC BLOOD PRESSURE: 146 MMHG | DIASTOLIC BLOOD PRESSURE: 78 MMHG | HEART RATE: 65 BPM | RESPIRATION RATE: 59 BRPM | OXYGEN SATURATION: 99 %

## 2018-01-12 VITALS
SYSTOLIC BLOOD PRESSURE: 154 MMHG | HEART RATE: 84 BPM | TEMPERATURE: 98.3 F | RESPIRATION RATE: 26 BRPM | OXYGEN SATURATION: 96 % | DIASTOLIC BLOOD PRESSURE: 79 MMHG

## 2018-01-12 VITALS — OXYGEN SATURATION: 94 %

## 2018-01-12 VITALS — HEART RATE: 83 BPM

## 2018-01-12 VITALS — HEART RATE: 66 BPM

## 2018-01-12 VITALS — HEART RATE: 75 BPM

## 2018-01-12 LAB
BUN SERPL-MCNC: 15 MG/DL (ref 7–18)
CALCIUM SERPL-MCNC: 9 MG/DL (ref 8.5–10.1)
CHLORIDE SERPL-SCNC: 106 MEQ/L (ref 98–107)
CREAT SERPL-MCNC: 0.47 MG/DL (ref 0.5–1)
ERYTHROCYTE [DISTWIDTH] IN BLOOD BY AUTOMATED COUNT: 13.6 % (ref 11.6–17.2)
GFR SERPLBLD BASED ON 1.73 SQ M-ARVRAT: 138 ML/MIN (ref 89–?)
GLUCOSE SERPL-MCNC: 132 MG/DL (ref 74–106)
HCO3 BLD-SCNC: 24.3 MEQ/L (ref 21–32)
HCT VFR BLD CALC: 35.3 % (ref 35–46)
HGB BLD-MCNC: 11.9 GM/DL (ref 11.6–15.3)
MAGNESIUM SERPL-MCNC: 2.3 MG/DL (ref 1.5–2.5)
MCH RBC QN AUTO: 29.5 PG (ref 27–34)
MCHC RBC AUTO-ENTMCNC: 33.7 % (ref 32–36)
MCV RBC AUTO: 87.6 FL (ref 80–100)
PHOSPHATE SERPL-MCNC: 1.9 MG/DL (ref 2.5–4.9)
PLATELET # BLD: 295 TH/MM3 (ref 150–450)
PMV BLD AUTO: 8.3 FL (ref 7–11)
RBC # BLD AUTO: 4.03 MIL/MM3 (ref 4–5.3)
SODIUM SERPL-SCNC: 139 MEQ/L (ref 136–145)
WBC # BLD AUTO: 14.6 TH/MM3 (ref 4–11)

## 2018-01-12 RX ADMIN — MORPHINE SULFATE PRN MG: 2 INJECTION, SOLUTION INTRAMUSCULAR; INTRAVENOUS at 07:41

## 2018-01-12 RX ADMIN — OXYCODONE HYDROCHLORIDE AND ACETAMINOPHEN PRN TAB: 7.5; 325 TABLET ORAL at 08:41

## 2018-01-12 RX ADMIN — OXYCODONE HYDROCHLORIDE AND ACETAMINOPHEN PRN TAB: 7.5; 325 TABLET ORAL at 00:39

## 2018-01-12 RX ADMIN — IPRATROPIUM BROMIDE AND ALBUTEROL SULFATE SCH AMPULE: .5; 3 SOLUTION RESPIRATORY (INHALATION) at 19:55

## 2018-01-12 RX ADMIN — IPRATROPIUM BROMIDE AND ALBUTEROL SULFATE SCH AMPULE: .5; 3 SOLUTION RESPIRATORY (INHALATION) at 08:12

## 2018-01-12 RX ADMIN — Medication SCH ML: at 07:44

## 2018-01-12 RX ADMIN — MORPHINE SULFATE PRN MG: 2 INJECTION, SOLUTION INTRAMUSCULAR; INTRAVENOUS at 23:00

## 2018-01-12 RX ADMIN — METHYLPREDNISOLONE SODIUM SUCCINATE SCH MG: 40 INJECTION, POWDER, FOR SOLUTION INTRAMUSCULAR; INTRAVENOUS at 00:36

## 2018-01-12 RX ADMIN — FAMOTIDINE SCH MG: 10 INJECTION, SOLUTION INTRAVENOUS at 04:17

## 2018-01-12 RX ADMIN — OXYCODONE HYDROCHLORIDE AND ACETAMINOPHEN PRN TAB: 7.5; 325 TABLET ORAL at 17:54

## 2018-01-12 RX ADMIN — Medication SCH ML: at 19:50

## 2018-01-12 RX ADMIN — NICOTINE SCH PATCH: 14 PATCH, EXTENDED RELEASE TOPICAL at 07:43

## 2018-01-12 RX ADMIN — IPRATROPIUM BROMIDE AND ALBUTEROL SULFATE SCH AMPULE: .5; 3 SOLUTION RESPIRATORY (INHALATION) at 14:26

## 2018-01-12 RX ADMIN — CEFEPIME SCH MLS/HR: 2 INJECTION, POWDER, FOR SOLUTION INTRAVENOUS at 00:38

## 2018-01-12 RX ADMIN — CEFEPIME SCH MLS/HR: 2 INJECTION, POWDER, FOR SOLUTION INTRAVENOUS at 07:42

## 2018-01-12 RX ADMIN — MORPHINE SULFATE PRN MG: 2 INJECTION, SOLUTION INTRAMUSCULAR; INTRAVENOUS at 04:17

## 2018-01-12 RX ADMIN — METHYLPREDNISOLONE SODIUM SUCCINATE SCH MG: 40 INJECTION, POWDER, FOR SOLUTION INTRAMUSCULAR; INTRAVENOUS at 07:43

## 2018-01-12 RX ADMIN — MORPHINE SULFATE PRN MG: 2 INJECTION, SOLUTION INTRAMUSCULAR; INTRAVENOUS at 19:50

## 2018-01-12 RX ADMIN — MORPHINE SULFATE PRN MG: 2 INJECTION, SOLUTION INTRAMUSCULAR; INTRAVENOUS at 12:23

## 2018-01-12 RX ADMIN — AZITHROMYCIN SCH MLS/HR: 500 INJECTION, POWDER, LYOPHILIZED, FOR SOLUTION INTRAVENOUS at 07:42

## 2018-01-12 RX ADMIN — IPRATROPIUM BROMIDE AND ALBUTEROL SULFATE SCH AMPULE: .5; 3 SOLUTION RESPIRATORY (INHALATION) at 11:19

## 2018-01-12 RX ADMIN — MORPHINE SULFATE PRN MG: 2 INJECTION, SOLUTION INTRAMUSCULAR; INTRAVENOUS at 15:52

## 2018-01-12 RX ADMIN — ENOXAPARIN SODIUM SCH MG: 40 INJECTION SUBCUTANEOUS at 15:47

## 2018-01-12 RX ADMIN — CHLORHEXIDINE GLUCONATE SCH PACK: 500 CLOTH TOPICAL at 04:00

## 2018-01-12 RX ADMIN — FAMOTIDINE SCH MG: 10 INJECTION, SOLUTION INTRAVENOUS at 15:48

## 2018-01-12 RX ADMIN — CEFEPIME SCH MLS/HR: 2 INJECTION, POWDER, FOR SOLUTION INTRAVENOUS at 16:57

## 2018-01-12 NOTE — HHI.CCPN
Subjective


Remarks/Hospital Course





53 year old female with history of paraplegia, TB (1986), PE (2010), neck 

abscess who presented to the ED on 1/9/2018 due to significant cough, shortness 

of breath that started about 10 days prior to this admission. She reports cough 

production and due to coughing fits, she has chest pain, abdominal pain. Per EMS

, her urine appeared to be cloudy too. Patient has chronic Ott catheter. 

Additionally she reports not able to sleep or eat much. Today she noted speck 

of blood in the sputum as well. In the ED, she was severely hypoxic and 

required non-rebreather to maintain O2 sat > 90%. Her HR was 106, Resp 24, Temp 

98.3 and /75.  She was admitted to ICU on hospitalist service, however 

developed worsening respiratory failure and critical care medicine was 

consulted for further management.


Subjective:


1/10: No acute events. The patient has been weaned to an FiO2 of 5 L/m nasal 

cannula from nonrebreather.  Patient complains of chest pain with violent 

coughing.  Patient tolerating diet.Urine antigen positive for strep pneumoniae.


1/11: Patient complained of pain 7/10 from coughing.  Additional narcotics 

ordered, with morphine IV for breakthrough pain.  Patient continues to have 

leukocytosis, antibiotics continued.  Patient tolerating diet.


1/12: Afebrile.  Patient has been weaned off of nasal cannula O2 saturation 95% 

on room air.  Leukocytosis improving.





Objective





Vital Signs








  Date Time  Temp Pulse Resp B/P (MAP) Pulse Ox O2 Delivery O2 Flow Rate FiO2


 


1/12/18 12:28   34     


 


1/12/18 12:00  99      


 


1/12/18 12:00 97.9   139/88 (105) 92   


 


1/11/18 20:37      Nasal Cannula 3.00 


 


1/10/18 01:01        40














Intake and Output   


 


 1/12/18 1/12/18 1/13/18





 08:00 16:00 00:00


 


Intake Total 580 ml  


 


Output Total 1350 ml  


 


Balance -770 ml  








Result Diagram:  


1/12/18 0536                                                                   

             1/12/18 0536





Imaging





Last Impressions








Chest X-Ray 1/12/18 0600 Signed





Impressions: 





 Service Date/Time:  Friday, January 12, 2018 04:11 - CONCLUSION:  The lungs 

are 





 grossly clear.     Alex Mahan MD 


 


CT Angiography 1/9/18 0000 Signed





Impressions: 





 Service Date/Time:  Tuesday, January 9, 2018 16:55 - CONCLUSION:  1. No 

evidence 





 of pulmonary embolism. 2. Scattered groundglass opacities in both lungs which 





 are nonspecific. The differential diagnosis includes pneumonia and 

interstitial 





 lung disease. 3. Mild consolidation in both posterior lung bases.      Yamil Antunez MD 








Last 24 hours Impressions








Chest X-Ray 1/9/18 1305 Signed





Impressions: 





 Service Date/Time:  Tuesday, January 9, 2018 13:17 - CONCLUSION:  1. Minimal 





 bibasilar atelectasis/scarring.     Kedar Salinas MD 








Objective Remarks


GENERAL: 53-year-old female paraplegic in no acute distress sitting up in bed 

drinking coffee


SKIN: Focused skin assessment warm/dry.


HEAD: Atraumatic. Normocephalic. 


EYES: Pupils equal and round. No scleral icterus. No injection or drainage. 


ENT: No nasal bleeding or discharge.  Mucous membranes pink and moist.


NECK: Trachea midline. No JVD. 


CARDIOVASCULAR:.  Regular rate, Regular rhythm.


RESPIRATORY: Minimal coarse wheezing bilaterally.  Minimal rhonchi bilaterally.


GASTROINTESTINAL: Abdomen soft, non-tender, nondistended. Hepatic and splenic 

margins not palpable. 


MUSCULOSKELETAL: Atrophic changes of the bilateral lower extremities.  Minimal 

atrophy of the upper extremities.


NEUROLOGICAL: GCS 15 AO 3.  Cranial nerve III through XII normal.  Speech 

memory and mentation normal.





A/P


Assessment and Plan


Acute respiratory failure with hypoxia-resolved


-  CXR reviewed unremarkable


- CT PE without evidence of pulmonary embolism however with some groundglass 

opacity in posterior lung bases


- Pneumonia


- Change ceftriaxone to cefepime at the patient with history of smoking 

underlying structural lung disease to cover for Pseudomonas


- Continue O2 to keep sats above 92%


- BiPAP when necessary


- Follow-up cultures and sensitivity


- Follow-up urine antigens- positive for Strep Pneumo


- 1/12-chest x-ray-clear





Tobacco abuse disorder


- Nicotine patch x 7 days





COPD


- DuoNeb scheduled and when necessary


- IV steroid 





Paraplegia 


- after neck abscess in 2010


- Lovenox DVT prophylaxis


- PT and OT eval and treat as tolerated





DVT GI prophylaxis


- Teds SCDs


- Lovenox


- Pepcid


- Regular diet





Critical Care:





Level 2 follow-up.  Transfer to [facility] in a.m.  Transfer to 

[facility] floor when bed available.


Physician


Aga Haque MD Jan 12, 2018 13:51

## 2018-01-12 NOTE — HHI.PR
Subjective


Remarks


Critical Care specialist Notes:


53 year old female with history of paraplegia, TB (1986), PE (2010), neck 

abscess who presented to the ED on 1/9/2018 due to significant cough, shortness 

of breath 


that started about 10 days prior to this admission. She reports cough 

production and due to coughing fits, she has chest pain, abdominal pain. Per EMS

, her urine 


appeared to be cloudy too. Patient has chronic Ott catheter. Additionally she 

reports not able to sleep or eat much. Today she noted speck of blood in the 

sputum 


as well. In the ED, she was severely hypoxic and required non-rebreather to 

maintain O2 sat > 90%. Her HR was 106, Resp 24, Temp 98.3 and /75.  She 

was 


admitted to ICU on hospitalist service, however developed worsening respiratory 

failure and critical care medicine was consulted for further management.





1/10: No acute events. The patient has been weaned to an FiO2 of 5 L/m nasal 

cannula from nonrebreather.  Patient complains of chest pain with violent 

coughing.  


        Patient tolerating diet.Urine antigen positive for strep pneumoniae.


1/11: Patient complained of pain 7/10 from coughing.  Additional narcotics 

ordered, with morphine IV for breakthrough pain.  Patient continues to have 

leukocytosis, 


        antibiotics continued.  Patient tolerating diet.





Hospitalist Notes:


1/12: Seen in his bedroom and discussed with nurse miss Cain, no new issues, 

no nausea, vomit or diarrhea, improving her condition replaced her Phosphorus 

and 


        and given PRN medicine for Hypertension, decreased Steroid IV doses 

slowly and will try to start by mouth tomorrow and discharge home.





Objective





Vital Signs








  Date Time  Temp Pulse Resp B/P (MAP) Pulse Ox O2 Delivery O2 Flow Rate FiO2


 


1/12/18 09:41   20     


 


1/12/18 08:13     94   


 


1/12/18 07:46   22     


 


1/12/18 06:00  66      


 


1/12/18 04:00 97.8 107 40 160/74 (102) 95   


 


1/12/18 04:00  107      


 


1/12/18 02:00  69      


 


1/12/18 00:00 97.9 65 59 146/78 (100) 99   


 


1/12/18 00:00  65      


 


1/11/18 22:00  106      


 


1/11/18 20:37     97 Nasal Cannula 3.00 


 


1/11/18 20:00 97.5 76 27 133/75 (94) 95   


 


1/11/18 20:00  76      


 


1/11/18 18:00  100      


 


1/11/18 16:00  111      


 


1/11/18 16:00 98.1 111 22 110/60 (77) 90   


 


1/11/18 14:00  93      


 


1/11/18 12:00  103      


 


1/11/18 12:00 98.4 103 24 113/63 (80) 92   














I/O      


 


 1/11/18 1/11/18 1/11/18 1/12/18 1/12/18 1/12/18





 07:00 15:00 23:00 07:00 15:00 23:00


 


Intake Total 340 ml 350 ml 950 ml 580 ml  


 


Output Total 1400 ml  750 ml 1350 ml  


 


Balance -1060 ml 350 ml 200 ml -770 ml  


 


      


 


Intake Oral 240 ml  850 ml 480 ml  


 


IV Total 100 ml 350 ml 100 ml 100 ml  


 


Output Urine Total 1400 ml  750 ml 1350 ml  


 


# Bowel Movements   0 0  








Result Diagram:  


1/12/18 0536                                                                   

             1/12/18 0536





Imaging





Last Impressions








Chest X-Ray 1/12/18 0600 Signed





Impressions: 





 Service Date/Time:  Friday, January 12, 2018 04:11 - CONCLUSION:  The lungs 

are 





 grossly clear.     Alex Mahan MD 


 


CT Angiography 1/9/18 0000 Signed





Impressions: 





 Service Date/Time:  Tuesday, January 9, 2018 16:55 - CONCLUSION:  1. No 

evidence 





 of pulmonary embolism. 2. Scattered groundglass opacities in both lungs which 





 are nonspecific. The differential diagnosis includes pneumonia and 

interstitial 





 lung disease. 3. Mild consolidation in both posterior lung bases.      Yamil Antunez MD 








Procedures


None


Other Results





Laboratory Tests








Test


  1/9/18


13:15 1/9/18


13:25 1/9/18


13:27 1/9/18


16:10


 


Blood Urea Nitrogen 7 MG/DL    


 


Creatinine 0.67 MG/DL    


 


Random Glucose 82 MG/DL    


 


Total Protein 8.4 GM/DL    


 


Albumin 3.6 GM/DL    


 


Calcium Level 9.8 MG/DL    


 


Magnesium Level 2.2 MG/DL    


 


Alkaline Phosphatase 99 U/L    


 


Aspartate Amino Transf


(AST/SGOT) 23 U/L 


  


  


  


 


 


Alanine Aminotransferase


(ALT/SGPT) 18 U/L 


  


  


  


 


 


Total Bilirubin 0.3 MG/DL    


 


Sodium Level 133 MEQ/L    


 


Potassium Level 4.0 MEQ/L    


 


Chloride Level 98 MEQ/L    


 


Carbon Dioxide Level 27.5 MEQ/L    


 


Lipase 95 U/L    


 


Urine Color  YELLOW   


 


Urine Turbidity  CLOUDY   


 


Urine pH  7.5   


 


Urine Specific Gravity  1.013   


 


Urine Protein  30 mg/dL   


 


Urine Glucose (UA)  NEG mg/dL   


 


Urine Ketones  NEG mg/dL   


 


Urine Occult Blood  SMALL   


 


Urine Nitrite  NEG   


 


Urine Bilirubin  NEG   


 


Urine Urobilinogen


  


  LESS THAN 2.0


MG/DL 


  


 


 


Urine Leukocyte Esterase  LARGE   


 


Urine RBC  8 /hpf   


 


Urine WBC  112 /hpf   


 


Urine Amorphous Sediment  OCC   


 


Urine Bacteria  MANY /hpf   


 


Urine Mucus  MANY /lpf   


 


Microscopic Urinalysis Comment


  


  CATH-CULTURE


IND 


  


 


 


Blood Gas Puncture Site   RT RADIAL  


 


Blood Gas Patient Temperature   98.6  


 


Blood Gas HCO3   25 mmol/L  


 


Blood Gas Base Excess   1.6 mmol/L  


 


Blood Gas Oxygen Saturation   91 %  


 


Arterial Blood pH   7.44  


 


Arterial Blood Partial


Pressure CO2 


  


  38 mmHg 


  


 


 


Arterial Blood Partial


Pressure O2 


  


  71 mmHG 


  


 


 


Arterial Blood Oxygen Content   18.0 Vol %  


 


Arterial Blood


Carboxyhemoglobin 


  


  2.3 % 


  


 


 


Arterial Blood Methemoglobin   0.8 %  


 


Blood Gas Hemoglobin   14.1 G/DL  


 


Oxygen Delivery Device   NRM  


 


Blood Gas Liter Flow   15 L/M  


 


Lactic Acid Level    1.4 mmol/L 


 


Test


  1/9/18


21:45 1/10/18


13:30 1/11/18


15:06 1/12/18


05:36


 


Nasal Screen MRSA (PCR)


  MRSA NOT


DETECTED 


  


  


 


 


Total Creatine Kinase  46 U/L   


 


Troponin I


  


  LESS THAN 0.02


NG/ML 


  


 


 


Neutrophils (%) (Auto)   94.0 %  


 


Lymphocytes (%) (Auto)   3.7 %  


 


Monocytes (%) (Auto)   2.2 %  


 


Eosinophils (%) (Auto)   0.0 %  


 


Basophils (%) (Auto)   0.1 %  


 


Neutrophils # (Auto)   18.9 TH/MM3  


 


Lymphocytes # (Auto)   0.7 TH/MM3  


 


Monocytes # (Auto)   0.4 TH/MM3  


 


Eosinophils # (Auto)   0.0 TH/MM3  


 


Basophils # (Auto)   0.0 TH/MM3  


 


CBC Comment   DIFF FINAL  


 


Differential Comment     


 


White Blood Count    14.6 TH/MM3 


 


Red Blood Count    4.03 MIL/MM3 


 


Hemoglobin    11.9 GM/DL 


 


Hematocrit    35.3 % 


 


Mean Corpuscular Volume    87.6 FL 


 


Mean Corpuscular Hemoglobin    29.5 PG 


 


Mean Corpuscular Hemoglobin


Concent 


  


  


  33.7 % 


 


 


Red Cell Distribution Width    13.6 % 


 


Platelet Count    295 TH/MM3 


 


Mean Platelet Volume    8.3 FL 


 


Blood Urea Nitrogen    15 MG/DL 


 


Creatinine    0.47 MG/DL 


 


Random Glucose    132 MG/DL 


 


Calcium Level    9.0 MG/DL 


 


Phosphorus Level    1.9 MG/DL 


 


Magnesium Level    2.3 MG/DL 


 


Sodium Level    139 MEQ/L 


 


Potassium Level    4.2 MEQ/L 


 


Chloride Level    106 MEQ/L 


 


Carbon Dioxide Level    24.3 MEQ/L 


 


Anion Gap    9 MEQ/L 


 


Estimat Glomerular Filtration


Rate 


  


  


  138 ML/MIN 


 








Objective Remarks


GENERAL: Paraplegic in no acute distress


SKIN: Focused skin assessment warm/dry.


HEAD: Atraumatic. Normocephalic. 


EYES: Pupils equal and round. No scleral icterus. No injection or drainage. 


ENT: No nasal bleeding or discharge.  Mucous membranes pink and moist.


NECK: Trachea midline. No JVD. 


CARDIOVASCULAR:.  Regular rate, Regular rhythm.


RESPIRATORY: Minimal wheezing bilaterally.  Minimal rhonchi bilaterally.


GASTROINTESTINAL: Abdomen soft, non-tender, nondistended. Hepatic and splenic 

margins not palpable. 


MUSCULOSKELETAL: Atrophic changes of the bilateral lower extremities.  Minimal 

atrophy of the upper extremities.


NEUROLOGICAL: GCS 15 AO 3.  Cranial nerve III through XII normal.  Speech 

memory and mentation normal.


Medications and IVs





Current Medications








 Medications


  (Trade)  Dose


 Ordered  Sig/Clarisa


 Route  Start Time


 Stop Time Status Last Admin


 


  (NS Flush)  2 ml  UNSCH  PRN


 IV FLUSH  1/9/18 15:15


     


 


 


  (NS Flush)  2 ml  BID


 IV FLUSH  1/9/18 21:00


    1/12/18 07:44


 


 


  (Tylenol)  650 mg  Q4H  PRN


 PO  1/9/18 15:15


     


 


 


  (Zofran Inj)  4 mg  Q6H  PRN


 IVP  1/9/18 15:15


     


 


 


  (Lovenox Inj)  40 mg  Q24H


 SQ  1/9/18 16:00


    1/11/18 15:05


 


 


  (Narcan Inj)  0.4 mg  UNSCH  PRN


 IV PUSH  1/9/18 15:15


     


 


 


  (Milk Of


 Magnesia Liq)  30 ml  Q12H  PRN


 PO  1/9/18 15:15


     


 


 


  (Senokot)  17.2 mg  Q12H  PRN


 PO  1/9/18 15:15


     


 


 


  (Dulcolax Supp)  10 mg  DAILY  PRN


 RECTAL  1/9/18 15:15


     


 


 


  (Lactulose Liq)  30 ml  DAILY  PRN


 PO  1/9/18 15:15


     


 


 


  (Duoneb Neb)  1 ampule  Q4HR WHILE AWAKE  NEB


 NEB  1/9/18 16:00


    1/12/18 08:12


 


 


  (Duoneb Neb)  1 ampule  Q4HR NEB  PRN


 NEB  1/9/18 15:15


     


 


 


  (SoluMEDROL INJ)  40 mg  Q6H


 IV PUSH  1/9/18 20:00


    1/12/18 07:43


 


 


 Miscellaneous


 Information  Patient in


 critical care


 unit?  Ass...  [id number]


 .XX  1/9/18 21:15


     


 


 


  (Chlorhexidine


 2% Cloth)  3 pack  DAILY@04


 TOPICAL  1/10/18 04:00


 1/14/18 04:01  1/12/18 04:00


 


 


  (Chlorhexidine


 2% Cloth)  3 pack  UNSCH  PRN


 TOPICAL  1/9/18 21:15


 1/14/18 21:04   


 


 


  (Morphine Inj)  2 mg  Q3H  PRN


 IV PUSH  1/9/18 21:15


    1/12/18 07:41


 


 


 Azithromycin 500


 mg/Sodium Chloride  250 ml @ 


 250 mls/hr  Q24H


 IV  1/10/18 09:00


    1/12/18 07:42


 


 


 Cefepime HCl 2000


 mg/Sodium Chloride  100 ml @ 


 200 mls/hr  Q8H


 IV  1/10/18 01:15


    1/12/18 07:42


 


 


  (Pepcid Inj)  20 mg  Q12H


 IV PUSH  1/10/18 05:00


    1/12/18 04:17


 


 


  (Habitrol 14 Mg


 Patch.24 Hr)  1 patch  DAILY


 T-DERMAL  1/10/18 16:00


    1/12/18 07:43


 


 


 Miscellaneous


 Information  1  DAILY


 T-DERMAL  1/11/18 09:00


    1/12/18 07:43


 


 


  (Percocet


 7.5-325 Mg)  1 tab  Q6H  PRN


 PO  1/11/18 11:00


    1/12/18 08:41


 











A/P


Assessment and Plan


1. Acute Respiratory Failure with Hypoxia Improved, New CXR grossly clear, CTA 

no evidence of Pulmonary Emboli found some ground-glass opacity in posterior 

lung


    bases, Pneumonia, initially on Ceftriaxone and switch to Cefepime to cover 

for Pseudomonas, continue Oxygen as needed, BiPAP when necessary. 


2. Tobacco dependence on Nicotine Patch, strongly recommended to stop smoking


3. COPD to continue Steroids, Bronchodilator, Mucolytic and incentive spirometry


4. Paraplegia after Neck abscess in 2010, Lovenox for DVT prophylaxis PT and OT 

as tolerated


5. Hypophosphatemia replaced today


6. Hypertension probable related to sodium retention secondary to steroids, 

will switch to by mouth Prednisone starting tomorrow and 


    started on PRN medicine for Systolic blood pressure over 160 mm Hg. 





SCDs and Lovenox


GI prophylaxis with Pepcid


continue regular diet.


Discharge Planning


Expected by tomorrow.











Antonio Conrad MD Jan 12, 2018 11:13

## 2018-01-12 NOTE — RADRPT
EXAM DATE/TIME:  01/12/2018 04:11 

 

[facility] COMPARISON:     

CHEST SINGLE AP, January 09, 2018, 13:17.

 

                     

INDICATIONS :     

Evaluate for pneumonia- shortness of breath

                     

 

MEDICAL HISTORY :            

Cardiovascular disease. paraplegia   

 

SURGICAL HISTORY :     

Tubal ligation.   

 

ENCOUNTER:     

Subsequent                                        

 

ACUITY:     

3 days      

 

PAIN SCORE:     

7/10

 

LOCATION:     

Bilateral chest 

 

FINDINGS:     

A single view of the chest demonstrates the lungs to be symmetrically aerated without evidence of mas
s, infiltrate or effusion.  The cardiomediastinal contours are unremarkable.  Osseous structures are 
intact.

 

CONCLUSION:     

The lungs are grossly clear.

 

 

 

 Alex Mahan MD on January 12, 2018 at 5:18           

Board Certified Radiologist.

 This report was verified electronically.

## 2018-01-13 VITALS
OXYGEN SATURATION: 98 % | DIASTOLIC BLOOD PRESSURE: 86 MMHG | SYSTOLIC BLOOD PRESSURE: 149 MMHG | RESPIRATION RATE: 25 BRPM | TEMPERATURE: 97.7 F | HEART RATE: 61 BPM

## 2018-01-13 VITALS — RESPIRATION RATE: 32 BRPM | TEMPERATURE: 97.5 F | HEART RATE: 93 BPM | OXYGEN SATURATION: 86 %

## 2018-01-13 VITALS
OXYGEN SATURATION: 96 % | SYSTOLIC BLOOD PRESSURE: 172 MMHG | RESPIRATION RATE: 19 BRPM | DIASTOLIC BLOOD PRESSURE: 81 MMHG | HEART RATE: 51 BPM | TEMPERATURE: 97.5 F

## 2018-01-13 VITALS — HEART RATE: 96 BPM

## 2018-01-13 VITALS
DIASTOLIC BLOOD PRESSURE: 86 MMHG | RESPIRATION RATE: 33 BRPM | SYSTOLIC BLOOD PRESSURE: 108 MMHG | HEART RATE: 104 BPM | TEMPERATURE: 97.7 F

## 2018-01-13 VITALS — OXYGEN SATURATION: 94 %

## 2018-01-13 VITALS
OXYGEN SATURATION: 94 % | RESPIRATION RATE: 25 BRPM | SYSTOLIC BLOOD PRESSURE: 139 MMHG | DIASTOLIC BLOOD PRESSURE: 88 MMHG | TEMPERATURE: 97.7 F | HEART RATE: 54 BPM

## 2018-01-13 VITALS — HEART RATE: 80 BPM

## 2018-01-13 VITALS — HEART RATE: 72 BPM

## 2018-01-13 VITALS — HEART RATE: 79 BPM

## 2018-01-13 VITALS — HEART RATE: 91 BPM

## 2018-01-13 RX ADMIN — AZITHROMYCIN SCH MLS/HR: 500 INJECTION, POWDER, LYOPHILIZED, FOR SOLUTION INTRAVENOUS at 08:44

## 2018-01-13 RX ADMIN — FAMOTIDINE SCH MG: 10 INJECTION, SOLUTION INTRAVENOUS at 05:12

## 2018-01-13 RX ADMIN — OXYCODONE HYDROCHLORIDE AND ACETAMINOPHEN PRN TAB: 7.5; 325 TABLET ORAL at 09:56

## 2018-01-13 RX ADMIN — CHLORHEXIDINE GLUCONATE SCH PACK: 500 CLOTH TOPICAL at 04:00

## 2018-01-13 RX ADMIN — CEFEPIME SCH MLS/HR: 2 INJECTION, POWDER, FOR SOLUTION INTRAVENOUS at 08:44

## 2018-01-13 RX ADMIN — MORPHINE SULFATE PRN MG: 2 INJECTION, SOLUTION INTRAMUSCULAR; INTRAVENOUS at 05:13

## 2018-01-13 RX ADMIN — MORPHINE SULFATE PRN MG: 2 INJECTION, SOLUTION INTRAMUSCULAR; INTRAVENOUS at 01:29

## 2018-01-13 RX ADMIN — MORPHINE SULFATE PRN MG: 2 INJECTION, SOLUTION INTRAMUSCULAR; INTRAVENOUS at 16:50

## 2018-01-13 RX ADMIN — CEFEPIME SCH MLS/HR: 2 INJECTION, POWDER, FOR SOLUTION INTRAVENOUS at 16:49

## 2018-01-13 RX ADMIN — Medication SCH ML: at 08:44

## 2018-01-13 RX ADMIN — FAMOTIDINE SCH MG: 10 INJECTION, SOLUTION INTRAVENOUS at 16:49

## 2018-01-13 RX ADMIN — CEFEPIME SCH MLS/HR: 2 INJECTION, POWDER, FOR SOLUTION INTRAVENOUS at 01:29

## 2018-01-13 RX ADMIN — ENOXAPARIN SODIUM SCH MG: 40 INJECTION SUBCUTANEOUS at 16:49

## 2018-01-13 RX ADMIN — IPRATROPIUM BROMIDE AND ALBUTEROL SULFATE SCH AMPULE: .5; 3 SOLUTION RESPIRATORY (INHALATION) at 11:19

## 2018-01-13 RX ADMIN — IPRATROPIUM BROMIDE AND ALBUTEROL SULFATE SCH AMPULE: .5; 3 SOLUTION RESPIRATORY (INHALATION) at 08:16

## 2018-01-13 RX ADMIN — MORPHINE SULFATE PRN MG: 2 INJECTION, SOLUTION INTRAMUSCULAR; INTRAVENOUS at 12:54

## 2018-01-13 RX ADMIN — OXYCODONE HYDROCHLORIDE AND ACETAMINOPHEN PRN TAB: 7.5; 325 TABLET ORAL at 02:16

## 2018-01-13 RX ADMIN — MORPHINE SULFATE PRN MG: 2 INJECTION, SOLUTION INTRAMUSCULAR; INTRAVENOUS at 08:46

## 2018-01-13 RX ADMIN — NICOTINE SCH PATCH: 14 PATCH, EXTENDED RELEASE TOPICAL at 08:45

## 2018-01-13 RX ADMIN — IPRATROPIUM BROMIDE AND ALBUTEROL SULFATE SCH AMPULE: .5; 3 SOLUTION RESPIRATORY (INHALATION) at 15:09

## 2018-01-13 NOTE — HHI.DS
__________________________________________________





Discharge Summary


Admission Date


Jan 9, 2018 at 15:02


Discharge Date:  Jan 13, 2018


Admitting Diagnosis





UTI, Hypoxia, Weakness





(1) Acute respiratory failure with hypoxia


ICD Code:  J96.01 - Acute respiratory failure with hypoxia


Diagnosis:  Principal





(2) Pneumonia


ICD Code:  J18.9 - Pneumonia, unspecified organism


Diagnosis:  Principal





(3) UTI (urinary tract infection)


ICD Code:  N39.0 - Urinary tract infection, site not specified


Diagnosis:  Principal


Status:  Acute


(4) Paraplegia


ICD Code:  G82.20 - Paraplegia, unspecified


Diagnosis:  Principal





Procedures


None


Brief History - From Admission


Ms. Campos is a pleasant 53 year old female with history of paraplegia, TB (

1986), PE (2010), neck abscess who presented to the ED on 1/9/2018 due to 

significant cough, shortness of breath that started about 10 days prior to this 

admission. She reports profuse cough production and due to coughing fits, she 

has chest pain, abdominal pain. Per EMS, her urine appeared to be cloudy too. 

Patient has chronic Ott catheter. Additionally she reports not able to sleep 

or eat much. Today she noted speck of blood in the sputum as well. In the ED, 

she was severely hypoxic and required non-rebreather to maintain O2 sat > 90%. 

Her HR was 106, Resp 24, Temp 98.3 and /75.


CBC/BMP:  


1/12/18 0536                                                                   

             1/12/18 0536





Significant Findings





Laboratory Tests








Test


  1/11/18


15:06 1/12/18


05:36


 


White Blood Count


  20.1 TH/MM3


(4.0-11.0) 14.6 TH/MM3


(4.0-11.0)


 


Neutrophils (%) (Auto)


  94.0 %


(16.0-70.0) 


 


 


Lymphocytes (%) (Auto)


  3.7 %


(9.0-44.0) 


 


 


Neutrophils # (Auto)


  18.9 TH/MM3


(1.8-7.7) 


 


 


Lymphocytes # (Auto)


  0.7 TH/MM3


(1.0-4.8) 


 


 


Random Glucose


  223 MG/DL


() 132 MG/DL


()


 


Estimat Glomerular Filtration


Rate 72 ML/MIN


(>89) 


 


 


Creatinine


  


  0.47 MG/DL


(0.50-1.00)


 


Phosphorus Level


  


  1.9 MG/DL


(2.5-4.9)








Imaging





Last Impressions








Chest X-Ray 1/12/18 0600 Signed





Impressions: 





 Service Date/Time:  Friday, January 12, 2018 04:11 - CONCLUSION:  The lungs 

are 





 grossly clear.     Alex Mahan MD 


 


CT Angiography 1/9/18 0000 Signed





Impressions: 





 Service Date/Time:  Tuesday, January 9, 2018 16:55 - CONCLUSION:  1. No 

evidence 





 of pulmonary embolism. 2. Scattered groundglass opacities in both lungs which 





 are nonspecific. The differential diagnosis includes pneumonia and 

interstitial 





 lung disease. 3. Mild consolidation in both posterior lung bases.      Yamil Antunez MD 








PE at Discharge


GENERAL: Paraplegic in no acute distress


SKIN: Focused skin assessment warm/dry.


HEAD: Atraumatic. Normocephalic. 


EYES: Pupils equal and round. No scleral icterus. No injection or drainage. 


ENT: No nasal bleeding or discharge.  Mucous membranes pink and moist.


NECK: Trachea midline. No JVD. 


CARDIOVASCULAR:.  Regular rate, Regular rhythm.


RESPIRATORY: Minimal wheezing bilaterally.  Minimal rhonchi bilaterally.


GASTROINTESTINAL: Abdomen soft, non-tender, nondistended. Hepatic and splenic 

margins not palpable. 


MUSCULOSKELETAL: Atrophic changes of the bilateral lower extremities.  Minimal 

atrophy of the upper extremities.


NEUROLOGICAL: GCS 15 AO 3.  Cranial nerve III through XII normal.  Speech 

memory and mentation normal.


Hospital Course


Critical Care specialist Notes:


53 year old female with history of paraplegia, TB (1986), PE (2010), neck 

abscess who presented to the ED on 1/9/2018 due to significant cough, shortness 

of breath 


that started about 10 days prior to this admission. She reports cough 

production and due to coughing fits, she has chest pain, abdominal pain. Per EMS

, her urine 


appeared to be cloudy too. Patient has chronic Ott catheter. Additionally she 

reports not able to sleep or eat much. Today she noted speck of blood in the 

sputum 


as well. In the ED, she was severely hypoxic and required non-rebreather to 

maintain O2 sat > 90%. Her HR was 106, Resp 24, Temp 98.3 and /75.  She 

was 


admitted to ICU on hospitalist service, however developed worsening respiratory 

failure and critical care medicine was consulted for further management.





1/10: No acute events. The patient has been weaned to an FiO2 of 5 L/m nasal 

cannula from nonrebreather.  Patient complains of chest pain with violent 

coughing.  


        Patient tolerating diet.Urine antigen positive for strep pneumoniae.


1/11: Patient complained of pain 7/10 from coughing.  Additional narcotics 

ordered, with morphine IV for breakthrough pain.  Patient continues to have 

leukocytosis, 


        antibiotics continued.  Patient tolerating diet.





Hospitalist Notes:


1/12: Seen in his bedroom and discussed with nurse miss Cain, no new issues, 

no nausea, vomit or diarrhea, improving her condition replaced her Phosphorus 

and 


        and given PRN medicine for Hypertension, decreased Steroid IV doses 

slowly and will try to start by mouth tomorrow and discharge home. 


1/13: Stable in her bedroom, no nausea, vomit or diarrhea, not using oxygen at 

all wants to go home I agree.





Assessment and Plan


1. Acute Respiratory Failure with Hypoxia Improved, New CXR grossly clear, CTA 

no evidence of Pulmonary Emboli found some ground-glass opacity in posterior 

lung


    bases, Pneumonia, initially on Ceftriaxone and switch to Cefepime to cover 

for Pseudomonas, continue Oxygen as needed, BiPAP when necessary. 


    not wearing oxygen anymore will continue Cefdinir and Azithromycin at home. 

follow with PCP. Leukocytosis secondary to Steroid use. 


2. Tobacco dependence on Nicotine Patch, strongly recommended to stop smoking


3. COPD to continue Steroids, Bronchodilator, Mucolytic and incentive spirometry

, continue Symbicort and Prednisone


4. Paraplegia after Neck abscess in 2010, Lovenox for DVT prophylaxis PT and OT 

as tolerated


5. Hypophosphatemia replaced 


6. Hypertension controlled no need for anti-Hypertensive medicines at this 

time. 





SCDs and Lovenox


GI prophylaxis with Pepcid


continue regular diet. 


Discharge home today.


Discharge Planning


Discharge home today.


Pt Condition on Discharge:  Good


Discharge Disposition:  Discharge Home


Discharge Time:  > 30 minutes


Discharge Instructions


DIET: Follow Instructions for:  Heart Healthy Diet


Activities you can perform:  Regular-No Restrictions











Antonio Conrad MD Jan 13, 2018 16:00

## 2018-01-13 NOTE — HHI.PR
Subjective


Remarks


Critical Care specialist Notes:


53 year old female with history of paraplegia, TB (1986), PE (2010), neck 

abscess who presented to the ED on 1/9/2018 due to significant cough, shortness 

of breath 


that started about 10 days prior to this admission. She reports cough 

production and due to coughing fits, she has chest pain, abdominal pain. Per EMS

, her urine 


appeared to be cloudy too. Patient has chronic Ott catheter. Additionally she 

reports not able to sleep or eat much. Today she noted speck of blood in the 

sputum 


as well. In the ED, she was severely hypoxic and required non-rebreather to 

maintain O2 sat > 90%. Her HR was 106, Resp 24, Temp 98.3 and /75.  She 

was 


admitted to ICU on hospitalist service, however developed worsening respiratory 

failure and critical care medicine was consulted for further management.





1/10: No acute events. The patient has been weaned to an FiO2 of 5 L/m nasal 

cannula from nonrebreather.  Patient complains of chest pain with violent 

coughing.  


        Patient tolerating diet.Urine antigen positive for strep pneumoniae.


1/11: Patient complained of pain 7/10 from coughing.  Additional narcotics 

ordered, with morphine IV for breakthrough pain.  Patient continues to have 

leukocytosis, 


        antibiotics continued.  Patient tolerating diet.





Hospitalist Notes:


1/12: Seen in his bedroom and discussed with nurse miss Cain, no new issues, 

no nausea, vomit or diarrhea, improving her condition replaced her Phosphorus 

and 


        and given PRN medicine for Hypertension, decreased Steroid IV doses 

slowly and will try to start by mouth tomorrow and discharge home. 


1/13: Stable in her bedroom, no nausea, vomit or diarrhea, not using oxygen at 

all wants to go home I agree.





Objective





Vital Signs








  Date Time  Temp Pulse Resp B/P (MAP) Pulse Ox O2 Delivery O2 Flow Rate FiO2


 


1/13/18 12:00 97.7 104 33 108/86 (93)    


 


1/13/18 10:00  96      


 


1/13/18 08:18     94   21


 


1/13/18 08:00 97.5 51 19 172/81 (111) 96   


 


1/13/18 08:00  51      


 


1/13/18 06:00  72      


 


1/13/18 04:00 97.7 54 25 139/88 (105) 94   


 


1/13/18 04:00  54      


 


1/13/18 02:00  80      


 


1/13/18 00:00 97.7 61 25 149/86 (107) 98   


 


1/13/18 00:00  61      


 


1/12/18 22:00  75      


 


1/12/18 20:00 98.3 84 26 154/79 (104) 96   


 


1/12/18 20:00  84      


 


1/12/18 19:55     98   21


 


1/12/18 18:54   34     


 


1/12/18 18:00  90      


 


1/12/18 16:00 98.6 98 37 124/75 (91) 94   


 


1/12/18 16:00  98      


 


1/12/18 15:57   34     














I/O      


 


 1/12/18 1/12/18 1/12/18 1/13/18 1/13/18 1/13/18





 07:00 15:00 23:00 07:00 15:00 23:00


 


Intake Total 580 ml 250 ml 1155 ml 580 ml  


 


Output Total 1350 ml  1675 ml 2000 ml  


 


Balance -770 ml 250 ml -520 ml -1420 ml  


 


      


 


Intake Oral 480 ml  900 ml 480 ml  


 


IV Total 100 ml 250 ml 255 ml 100 ml  


 


Output Urine Total 1350 ml  1675 ml 2000 ml  


 


# Bowel Movements 0  0 0  








Result Diagram:  


1/12/18 0536                                                                   

             1/12/18 0536





Imaging





Last Impressions








Chest X-Ray 1/12/18 0600 Signed





Impressions: 





 Service Date/Time:  Friday, January 12, 2018 04:11 - CONCLUSION:  The lungs 

are 





 grossly clear.     Alex Mahan MD 


 


CT Angiography 1/9/18 0000 Signed





Impressions: 





 Service Date/Time:  Tuesday, January 9, 2018 16:55 - CONCLUSION:  1. No 

evidence 





 of pulmonary embolism. 2. Scattered groundglass opacities in both lungs which 





 are nonspecific. The differential diagnosis includes pneumonia and 

interstitial 





 lung disease. 3. Mild consolidation in both posterior lung bases.      Yamil Antunez MD 








Procedures


None


Other Results





Laboratory Tests








Test


  1/9/18


13:15 1/9/18


13:25 1/9/18


13:27 1/9/18


16:10


 


Blood Urea Nitrogen 7 MG/DL    


 


Creatinine 0.67 MG/DL    


 


Random Glucose 82 MG/DL    


 


Total Protein 8.4 GM/DL    


 


Albumin 3.6 GM/DL    


 


Calcium Level 9.8 MG/DL    


 


Magnesium Level 2.2 MG/DL    


 


Alkaline Phosphatase 99 U/L    


 


Aspartate Amino Transf


(AST/SGOT) 23 U/L 


  


  


  


 


 


Alanine Aminotransferase


(ALT/SGPT) 18 U/L 


  


  


  


 


 


Total Bilirubin 0.3 MG/DL    


 


Sodium Level 133 MEQ/L    


 


Potassium Level 4.0 MEQ/L    


 


Chloride Level 98 MEQ/L    


 


Carbon Dioxide Level 27.5 MEQ/L    


 


Lipase 95 U/L    


 


Urine Color  YELLOW   


 


Urine Turbidity  CLOUDY   


 


Urine pH  7.5   


 


Urine Specific Gravity  1.013   


 


Urine Protein  30 mg/dL   


 


Urine Glucose (UA)  NEG mg/dL   


 


Urine Ketones  NEG mg/dL   


 


Urine Occult Blood  SMALL   


 


Urine Nitrite  NEG   


 


Urine Bilirubin  NEG   


 


Urine Urobilinogen


  


  LESS THAN 2.0


MG/DL 


  


 


 


Urine Leukocyte Esterase  LARGE   


 


Urine RBC  8 /hpf   


 


Urine WBC  112 /hpf   


 


Urine Amorphous Sediment  OCC   


 


Urine Bacteria  MANY /hpf   


 


Urine Mucus  MANY /lpf   


 


Microscopic Urinalysis Comment


  


  CATH-CULTURE


IND 


  


 


 


Blood Gas Puncture Site   RT RADIAL  


 


Blood Gas Patient Temperature   98.6  


 


Blood Gas HCO3   25 mmol/L  


 


Blood Gas Base Excess   1.6 mmol/L  


 


Blood Gas Oxygen Saturation   91 %  


 


Arterial Blood pH   7.44  


 


Arterial Blood Partial


Pressure CO2 


  


  38 mmHg 


  


 


 


Arterial Blood Partial


Pressure O2 


  


  71 mmHG 


  


 


 


Arterial Blood Oxygen Content   18.0 Vol %  


 


Arterial Blood


Carboxyhemoglobin 


  


  2.3 % 


  


 


 


Arterial Blood Methemoglobin   0.8 %  


 


Blood Gas Hemoglobin   14.1 G/DL  


 


Oxygen Delivery Device   NRM  


 


Blood Gas Liter Flow   15 L/M  


 


Lactic Acid Level    1.4 mmol/L 


 


Test


  1/9/18


21:45 1/10/18


13:30 1/11/18


15:06 1/12/18


05:36


 


Nasal Screen MRSA (PCR)


  MRSA NOT


DETECTED 


  


  


 


 


Total Creatine Kinase  46 U/L   


 


Troponin I


  


  LESS THAN 0.02


NG/ML 


  


 


 


Neutrophils (%) (Auto)   94.0 %  


 


Lymphocytes (%) (Auto)   3.7 %  


 


Monocytes (%) (Auto)   2.2 %  


 


Eosinophils (%) (Auto)   0.0 %  


 


Basophils (%) (Auto)   0.1 %  


 


Neutrophils # (Auto)   18.9 TH/MM3  


 


Lymphocytes # (Auto)   0.7 TH/MM3  


 


Monocytes # (Auto)   0.4 TH/MM3  


 


Eosinophils # (Auto)   0.0 TH/MM3  


 


Basophils # (Auto)   0.0 TH/MM3  


 


CBC Comment   DIFF FINAL  


 


Differential Comment     


 


White Blood Count    14.6 TH/MM3 


 


Red Blood Count    4.03 MIL/MM3 


 


Hemoglobin    11.9 GM/DL 


 


Hematocrit    35.3 % 


 


Mean Corpuscular Volume    87.6 FL 


 


Mean Corpuscular Hemoglobin    29.5 PG 


 


Mean Corpuscular Hemoglobin


Concent 


  


  


  33.7 % 


 


 


Red Cell Distribution Width    13.6 % 


 


Platelet Count    295 TH/MM3 


 


Mean Platelet Volume    8.3 FL 


 


Blood Urea Nitrogen    15 MG/DL 


 


Creatinine    0.47 MG/DL 


 


Random Glucose    132 MG/DL 


 


Calcium Level    9.0 MG/DL 


 


Phosphorus Level    1.9 MG/DL 


 


Magnesium Level    2.3 MG/DL 


 


Sodium Level    139 MEQ/L 


 


Potassium Level    4.2 MEQ/L 


 


Chloride Level    106 MEQ/L 


 


Carbon Dioxide Level    24.3 MEQ/L 


 


Anion Gap    9 MEQ/L 


 


Estimat Glomerular Filtration


Rate 


  


  


  138 ML/MIN 


 








Objective Remarks


GENERAL: Paraplegic in no acute distress


SKIN: Focused skin assessment warm/dry.


HEAD: Atraumatic. Normocephalic. 


EYES: Pupils equal and round. No scleral icterus. No injection or drainage. 


ENT: No nasal bleeding or discharge.  Mucous membranes pink and moist.


NECK: Trachea midline. No JVD. 


CARDIOVASCULAR:.  Regular rate, Regular rhythm.


RESPIRATORY: Minimal wheezing bilaterally.  Minimal rhonchi bilaterally.


GASTROINTESTINAL: Abdomen soft, non-tender, nondistended. Hepatic and splenic 

margins not palpable. 


MUSCULOSKELETAL: Atrophic changes of the bilateral lower extremities.  Minimal 

atrophy of the upper extremities.


NEUROLOGICAL: GCS 15 AO 3.  Cranial nerve III through XII normal.  Speech 

memory and mentation normal.


Medications and IVs





Current Medications








 Medications


  (Trade)  Dose


 Ordered  Sig/Clarisa


 Route  Start Time


 Stop Time Status Last Admin


 


  (NS Flush)  2 ml  UNSCH  PRN


 IV FLUSH  1/9/18 15:15


     


 


 


  (NS Flush)  2 ml  BID


 IV FLUSH  1/9/18 21:00


    1/13/18 08:44


 


 


  (Tylenol)  650 mg  Q4H  PRN


 PO  1/9/18 15:15


     


 


 


  (Zofran Inj)  4 mg  Q6H  PRN


 IVP  1/9/18 15:15


     


 


 


  (Lovenox Inj)  40 mg  Q24H


 SQ  1/9/18 16:00


    1/12/18 15:47


 


 


  (Narcan Inj)  0.4 mg  UNSCH  PRN


 IV PUSH  1/9/18 15:15


     


 


 


  (Milk Of


 Magnesia Liq)  30 ml  Q12H  PRN


 PO  1/9/18 15:15


     


 


 


  (Senokot)  17.2 mg  Q12H  PRN


 PO  1/9/18 15:15


     


 


 


  (Dulcolax Supp)  10 mg  DAILY  PRN


 RECTAL  1/9/18 15:15


     


 


 


  (Lactulose Liq)  30 ml  DAILY  PRN


 PO  1/9/18 15:15


     


 


 


  (Duoneb Neb)  1 ampule  Q4HR WHILE AWAKE  NEB


 NEB  1/9/18 16:00


    1/13/18 11:19


 


 


  (Duoneb Neb)  1 ampule  Q4HR NEB  PRN


 NEB  1/9/18 15:15


     


 


 


 Miscellaneous


 Information  Patient in


 critical care


 unit?  Ass...  Q361D


 .XX  1/9/18 21:15


     


 


 


  (Chlorhexidine


 2% Cloth)  3 pack  DAILY@04


 TOPICAL  1/10/18 04:00


 1/14/18 04:01  1/13/18 04:00


 


 


  (Chlorhexidine


 2% Cloth)  3 pack  UNSCH  PRN


 TOPICAL  1/9/18 21:15


 1/14/18 21:04   


 


 


  (Morphine Inj)  2 mg  Q3H  PRN


 IV PUSH  1/9/18 21:15


    1/13/18 12:54


 


 


 Azithromycin 500


 mg/Sodium Chloride  250 ml @ 


 250 mls/hr  Q24H


 IV  1/10/18 09:00


    1/13/18 08:44


 


 


 Cefepime HCl 2000


 mg/Sodium Chloride  100 ml @ 


 200 mls/hr  Q8H


 IV  1/10/18 01:15


    1/13/18 08:44


 


 


  (Pepcid Inj)  20 mg  Q12H


 IV PUSH  1/10/18 05:00


    1/13/18 05:12


 


 


  (Habitrol 14 Mg


 Patch.24 Hr)  1 patch  DAILY


 T-DERMAL  1/10/18 16:00


    1/13/18 08:45


 


 


 Miscellaneous


 Information  1  DAILY


 T-DERMAL  1/11/18 09:00


    1/13/18 08:45


 


 


  (Percocet


 7.5-325 Mg)  1 tab  Q6H  PRN


 PO  1/11/18 11:00


    1/13/18 09:56


 


 


  (Catapres)  0.1 mg  Q6H  PRN


 PO  1/12/18 12:00


     


 


 


  (Deltasone)  40 mg  DAILY


 PO  1/13/18 09:00


    1/13/18 08:44


 











A/P


Assessment and Plan


1. Acute Respiratory Failure with Hypoxia Improved, New CXR grossly clear, CTA 

no evidence of Pulmonary Emboli found some ground-glass opacity in posterior 

lung


    bases, Pneumonia, initially on Ceftriaxone and switch to Cefepime to cover 

for Pseudomonas, continue Oxygen as needed, BiPAP when necessary. 


    not wearing oxygen anymore will continue Cefdinir and Azithromycin at home. 

follow with PCP. Leukocytosis secondary to Steroid use. 


2. Tobacco dependence on Nicotine Patch, strongly recommended to stop smoking


3. COPD to continue Steroids, Bronchodilator, Mucolytic and incentive spirometry

, continue Symbicort and Prednisone


4. Paraplegia after Neck abscess in 2010, Lovenox for DVT prophylaxis PT and OT 

as tolerated


5. Hypophosphatemia replaced 


6. Hypertension controlled no need for anti-Hypertensive medicines at this 

time. 





SCDs and Lovenox


GI prophylaxis with Pepcid


continue regular diet. 


Discharge home today.


Discharge Planning


Discharge home today.











Antonio Conrad MD Jan 13, 2018 15:58

## 2018-02-21 ENCOUNTER — HOSPITAL ENCOUNTER (INPATIENT)
Dept: HOSPITAL 17 - NEPD | Age: 53
LOS: 4 days | Discharge: HOME HEALTH | DRG: 698 | End: 2018-02-25
Attending: HOSPITALIST | Admitting: HOSPITALIST
Payer: COMMERCIAL

## 2018-02-21 VITALS
DIASTOLIC BLOOD PRESSURE: 77 MMHG | OXYGEN SATURATION: 95 % | RESPIRATION RATE: 14 BRPM | SYSTOLIC BLOOD PRESSURE: 118 MMHG | HEART RATE: 56 BPM

## 2018-02-21 VITALS
OXYGEN SATURATION: 99 % | SYSTOLIC BLOOD PRESSURE: 118 MMHG | RESPIRATION RATE: 18 BRPM | DIASTOLIC BLOOD PRESSURE: 78 MMHG | TEMPERATURE: 98.2 F | HEART RATE: 88 BPM

## 2018-02-21 VITALS — BODY MASS INDEX: 27.55 KG/M2 | HEIGHT: 65 IN | WEIGHT: 165.35 LBS

## 2018-02-21 DIAGNOSIS — G82.20: ICD-10-CM

## 2018-02-21 DIAGNOSIS — Z23: ICD-10-CM

## 2018-02-21 DIAGNOSIS — Z88.1: ICD-10-CM

## 2018-02-21 DIAGNOSIS — N39.0: ICD-10-CM

## 2018-02-21 DIAGNOSIS — F17.210: ICD-10-CM

## 2018-02-21 DIAGNOSIS — T83.518A: Primary | ICD-10-CM

## 2018-02-21 DIAGNOSIS — K85.90: ICD-10-CM
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
## 2018-02-21 DIAGNOSIS — K44.9: ICD-10-CM

## 2018-02-21 DIAGNOSIS — F41.9: ICD-10-CM

## 2018-02-21 DIAGNOSIS — Z88.2: ICD-10-CM

## 2018-02-21 DIAGNOSIS — E87.6: ICD-10-CM

## 2018-02-21 DIAGNOSIS — Z86.11: ICD-10-CM

## 2018-02-21 DIAGNOSIS — K29.70: ICD-10-CM

## 2018-02-21 DIAGNOSIS — K25.9: ICD-10-CM

## 2018-02-21 DIAGNOSIS — Z86.711: ICD-10-CM

## 2018-02-21 DIAGNOSIS — K59.09: ICD-10-CM

## 2018-02-21 LAB
ALBUMIN SERPL-MCNC: 3.1 GM/DL (ref 3.4–5)
ALP SERPL-CCNC: 78 U/L (ref 45–117)
ALT SERPL-CCNC: 23 U/L (ref 10–53)
AMORPHOUS SEDIMENT, URINE: (no result)
AST SERPL-CCNC: 27 U/L (ref 15–37)
BACTERIA #/AREA URNS HPF: (no result) /HPF
BASOPHILS # BLD AUTO: 0 TH/MM3 (ref 0–0.2)
BASOPHILS NFR BLD: 0.5 % (ref 0–2)
BILIRUB SERPL-MCNC: 0.2 MG/DL (ref 0.2–1)
BUN SERPL-MCNC: 6 MG/DL (ref 7–18)
CALCIUM SERPL-MCNC: 8.5 MG/DL (ref 8.5–10.1)
CHLORIDE SERPL-SCNC: 112 MEQ/L (ref 98–107)
COLOR UR: (no result)
CREAT SERPL-MCNC: 0.54 MG/DL (ref 0.5–1)
EOSINOPHIL # BLD: 0.1 TH/MM3 (ref 0–0.4)
EOSINOPHIL NFR BLD: 0.9 % (ref 0–4)
ERYTHROCYTE [DISTWIDTH] IN BLOOD BY AUTOMATED COUNT: 14.8 % (ref 11.6–17.2)
GFR SERPLBLD BASED ON 1.73 SQ M-ARVRAT: 117 ML/MIN (ref 89–?)
GLUCOSE SERPL-MCNC: 76 MG/DL (ref 74–106)
GLUCOSE UR STRIP-MCNC: (no result) MG/DL
HCO3 BLD-SCNC: 24.9 MEQ/L (ref 21–32)
HCT VFR BLD CALC: 43.2 % (ref 35–46)
HGB BLD-MCNC: 14.5 GM/DL (ref 11.6–15.3)
HGB UR QL STRIP: (no result)
KETONES UR STRIP-MCNC: (no result) MG/DL
LYMPHOCYTES # BLD AUTO: 2.1 TH/MM3 (ref 1–4.8)
LYMPHOCYTES NFR BLD AUTO: 22.8 % (ref 9–44)
MCH RBC QN AUTO: 29.3 PG (ref 27–34)
MCHC RBC AUTO-ENTMCNC: 33.6 % (ref 32–36)
MCV RBC AUTO: 87.2 FL (ref 80–100)
MONOCYTE #: 0.7 TH/MM3 (ref 0–0.9)
MONOCYTES NFR BLD: 7.3 % (ref 0–8)
NEUTROPHILS # BLD AUTO: 6.3 TH/MM3 (ref 1.8–7.7)
NEUTROPHILS NFR BLD AUTO: 68.5 % (ref 16–70)
NITRITE UR QL STRIP: (no result)
PLATELET # BLD: 262 TH/MM3 (ref 150–450)
PMV BLD AUTO: 8.9 FL (ref 7–11)
PROT SERPL-MCNC: 6.7 GM/DL (ref 6.4–8.2)
RBC # BLD AUTO: 4.95 MIL/MM3 (ref 4–5.3)
SODIUM SERPL-SCNC: 145 MEQ/L (ref 136–145)
SP GR UR STRIP: 1 (ref 1–1.03)
SQUAMOUS #/AREA URNS HPF: <1 /HPF (ref 0–5)
URINE LEUKOCYTE ESTERASE: (no result)
WBC # BLD AUTO: 9.2 TH/MM3 (ref 4–11)

## 2018-02-21 PROCEDURE — 96375 TX/PRO/DX INJ NEW DRUG ADDON: CPT

## 2018-02-21 PROCEDURE — 88312 SPECIAL STAINS GROUP 1: CPT

## 2018-02-21 PROCEDURE — 81001 URINALYSIS AUTO W/SCOPE: CPT

## 2018-02-21 PROCEDURE — 90686 IIV4 VACC NO PRSV 0.5 ML IM: CPT

## 2018-02-21 PROCEDURE — 83605 ASSAY OF LACTIC ACID: CPT

## 2018-02-21 PROCEDURE — 83690 ASSAY OF LIPASE: CPT

## 2018-02-21 PROCEDURE — 74177 CT ABD & PELVIS W/CONTRAST: CPT

## 2018-02-21 PROCEDURE — 87077 CULTURE AEROBIC IDENTIFY: CPT

## 2018-02-21 PROCEDURE — 80048 BASIC METABOLIC PNL TOTAL CA: CPT

## 2018-02-21 PROCEDURE — 88305 TISSUE EXAM BY PATHOLOGIST: CPT

## 2018-02-21 PROCEDURE — 96361 HYDRATE IV INFUSION ADD-ON: CPT

## 2018-02-21 PROCEDURE — 85025 COMPLETE CBC W/AUTO DIFF WBC: CPT

## 2018-02-21 PROCEDURE — 51702 INSERT TEMP BLADDER CATH: CPT

## 2018-02-21 PROCEDURE — 80053 COMPREHEN METABOLIC PANEL: CPT

## 2018-02-21 PROCEDURE — 87086 URINE CULTURE/COLONY COUNT: CPT

## 2018-02-21 PROCEDURE — 96365 THER/PROPH/DIAG IV INF INIT: CPT

## 2018-02-21 PROCEDURE — 87186 SC STD MICRODIL/AGAR DIL: CPT

## 2018-02-21 NOTE — PD
Physical Exam


Time Seen by Provider:  17:02


Narrative


Patient was seen by previous provider.  The complete history and physical for 

full note.  Per initial provider's note the HPI stated "53-year-old female that 

presents to the ED for evaluation of lower abdominal pain and need for catheter 

removal.  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 

spine.  She has no feeling or movement of her lower legs.  She usually uses a 

wheelchair to get about.  She states that she has not had her Ott catheter 

removed in the past 6 weeks and she is concerned that she might have an 

infection.  Per patient she's had since symptoms before with UTI.  She is 

concerned she has one.  She states that the pain is 7 out of 10.  His been 

progressively getting worse for the past 3 days.  No dysuria or polyuria.  Per 

patient the urine does look infected.  She states that she used to get home 

health help at home but she has not had any in some time.  Denies any chest 

pain or shortness of breath.  No diarrhea issues.  No other medical issues at 

this time.  States that she does feel like she has fevers and chills."





Data


Data


Last Documented VS





Vital Signs








  Date Time  Temp Pulse Resp B/P (MAP) Pulse Ox O2 Delivery O2 Flow Rate FiO2


 


2/21/18 20:00  56 14 118/77 (91) 95   


 


2/21/18 13:10 98.2       








Orders





 Orders


Complete Blood Count With Diff (2/21/18 13:19)


Comprehensive Metabolic Panel (2/21/18 13:19)


Urinalysis - C+S If Indicated (2/21/18 13:19)


Lipase (2/21/18 13:19)


Sodium Chlor 0.9% 1000 Ml Inj (Ns 1000 M (2/21/18 14:15)


Lactic Acid Sepsis Protocol (2/21/18 14:13)


Morphine Inj (Morphine Inj) (2/21/18 15:30)


Ondansetron Inj (Zofran Inj) (2/21/18 15:30)


Morphine Inj (Morphine Inj) (2/21/18 17:15)


Urinary Catheter - Remove (2/21/18 17:10)


Urinary Catheter Insert/Apply (2/21/18 17:19)


Ceftriaxone Inj (Rocephin Inj) (2/21/18 19:00)


Urine Culture (2/21/18 18:45)


Ct Abd/Pel W Iv Contrast(Rout) (2/21/18 20:42)


Morphine Inj (Morphine Inj) (2/21/18 20:45)


Sodium Chlor 0.9% 1000 Ml Inj (Ns 1000 M (2/21/18 20:42)


Iohexol 350 Inj (Omnipaque 350 Inj) (2/21/18 21:40)


Admit Order (Ed Use Only) (2/21/18 22:12)





Labs





Laboratory Tests








Test


  2/21/18


14:00 2/21/18


15:30 2/21/18


18:45 2/21/18


19:45


 


White Blood Count 9.2 TH/MM3    


 


Red Blood Count 4.95 MIL/MM3    


 


Hemoglobin 14.5 GM/DL    


 


Hematocrit 43.2 %    


 


Mean Corpuscular Volume 87.2 FL    


 


Mean Corpuscular Hemoglobin 29.3 PG    


 


Mean Corpuscular Hemoglobin


Concent 33.6 % 


  


  


  


 


 


Red Cell Distribution Width 14.8 %    


 


Platelet Count 262 TH/MM3    


 


Mean Platelet Volume 8.9 FL    


 


Neutrophils (%) (Auto) 68.5 %    


 


Lymphocytes (%) (Auto) 22.8 %    


 


Monocytes (%) (Auto) 7.3 %    


 


Eosinophils (%) (Auto) 0.9 %    


 


Basophils (%) (Auto) 0.5 %    


 


Neutrophils # (Auto) 6.3 TH/MM3    


 


Lymphocytes # (Auto) 2.1 TH/MM3    


 


Monocytes # (Auto) 0.7 TH/MM3    


 


Eosinophils # (Auto) 0.1 TH/MM3    


 


Basophils # (Auto) 0.0 TH/MM3    


 


CBC Comment DIFF FINAL    


 


Differential Comment     


 


Lactic Acid Level  0.7 mmol/L   


 


Urine Color   LIGHT-YELLOW  


 


Urine Turbidity   HAZY  


 


Urine pH   7.0  


 


Urine Specific Gravity   1.003  


 


Urine Protein   NEG mg/dL  


 


Urine Glucose (UA)   NEG mg/dL  


 


Urine Ketones   NEG mg/dL  


 


Urine Occult Blood   TRACE  


 


Urine Nitrite   POS  


 


Urine Bilirubin   NEG  


 


Urine Urobilinogen


  


  


  LESS THAN 2.0


MG/DL 


 


 


Urine Leukocyte Esterase   LARGE  


 


Urine RBC   4 /hpf  


 


Urine WBC   20 /hpf  


 


Urine Squamous Epithelial


Cells 


  


  <1 /hpf 


  


 


 


Urine Amorphous Sediment   RARE  


 


Urine Bacteria   FEW /hpf  


 


Microscopic Urinalysis Comment


  


  


  CULTURE


INDICATED 


 


 


Blood Urea Nitrogen    6 MG/DL 


 


Creatinine    0.54 MG/DL 


 


Random Glucose    76 MG/DL 


 


Total Protein    6.7 GM/DL 


 


Albumin    3.1 GM/DL 


 


Calcium Level    8.5 MG/DL 


 


Alkaline Phosphatase    78 U/L 


 


Aspartate Amino Transf


(AST/SGOT) 


  


  


  27 U/L 


 


 


Alanine Aminotransferase


(ALT/SGPT) 


  


  


  23 U/L 


 


 


Total Bilirubin    0.2 MG/DL 


 


Sodium Level    145 MEQ/L 


 


Potassium Level    4.0 MEQ/L 


 


Chloride Level    112 MEQ/L 


 


Carbon Dioxide Level    24.9 MEQ/L 


 


Anion Gap    8 MEQ/L 


 


Estimat Glomerular Filtration


Rate 


  


  


  117 ML/MIN 


 


 


Lipase    1266 U/L 











MDM


Supervised Visit with CHRISTINE:  Yes


Narrative Course


On my examination the patient states that she has an indwelling Ott catheter 

that is normally changed every 2 weeks.  Her current catheter has been in for 7 

weeks.  She denies fever, vomiting.  Denies abdominal pain.  Reports bladder 

pan and pressure.  she was given morphine and Zofran by the initial provider 

and that helped her pain.  She states her pain is starting to increase again.  

She denies nausea at this time.  CBC, CMP, lactic acid, urinalysis, lipase 

ordered by initial provider.  Removal of Ott catheter and reinsertion of 

Ott catheter ordered.


1759: On review of labs CBC is unremarkable.  Lactic acid 0.7. 


1900: CMP and urinalysis pending.  I did order Rocephin 1 g for suspected UTI; 

urine is extremely cloudy and foul-smelling.


1915: Report given to Dr. Colon at change of shift.  See his note for final 

patient disposition.











Ana Scott Feb 21, 2018 17:04

## 2018-02-21 NOTE — RADRPT
EXAM DATE/TIME:  02/21/2018 21:34 

 

[facility] COMPARISON:     

CT PULMONARY ANGIOGRAM, January 09, 2018, 16:55.

 

 

INDICATIONS :     

Bilateral lower abdominal pain. 

                      

 

IV CONTRAST:     

100 cc Omnipaque 350 (iohexol) IV 

 

 

ORAL CONTRAST:      

No oral contrast ingested.

                      

 

RADIATION DOSE:     

13.15 CTDIvol (mGy) 

 

 

MEDICAL HISTORY :       

Cardiovascular disease. Hepatitis. Renal calculi. Quadraplegia.

 

SURGICAL HISTORY :      

Tubal ligation. 

 

ENCOUNTER:      

Initial

 

ACUITY:      

2 days

 

PAIN SCALE:      

7/10

 

LOCATION:       

Bilateral lower quadrant 

 

TECHNIQUE:     

Volumetric scanning of the abdomen and pelvis was performed.  Using automated exposure control and ad
justment of the mA and/or kV according to patient size, radiation dose was kept as low as reasonably 
achievable to obtain optimal diagnostic quality images.  DICOM format image data is available electro
nically for review and comparison.  

 

FINDINGS:     

The limited portion of the lung base demonstrates an area of linear atelectasis in the left lower lob
e.

 

The appearance of the liver, spleen, pancreas, adrenal glands and kidneys is within normal limits. Th
ere is no retroperitoneal adenopathy. The abdominal aorta is normal in caliber.

 

Note is made of an IVC filter. The IVC is atretic.

 

The visualized loops of small and large bowel are unremarkable in appearance. There is no free intrap
eritoneal air. There is no free peritoneal fluid.

 

Note is made of a Ott catheter within the bladder.

.

 

The osseous structures demonstrate a Girdlestone procedure the right hip and severe degenerative kaplan
ges in the left hip are otherwise intact.

 

CONCLUSION:     

1. Ott catheter within the bladder.

2. Degenerative and postsurgical changes in the hips.

3. Moderate amount of stool within the rectum but no definite abnormality to explain the lower, pain 
identified.

 

 

 

 Quentin Sung MD on February 21, 2018 at 21:58           

Board Certified Radiologist.

 This report was verified electronically.

## 2018-02-21 NOTE — PD
HPI


Chief Complaint:  Abdominal Pain


Time Seen by Provider:  14:01


Travel History


International Travel<30 days:  No


Contact w/Intl Traveler<30days:  No


Traveled to known affect area:  No





History of Present Illness


HPI


53-year-old female that presents to the ED for evaluation of lower abdominal 

pain and need for catheter removal.  Per patient she is a quadriplegic 

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  She has no feeling or movement of her 

lower legs.  She usually uses a wheelchair to get about.  She states that she 

has not had her Ott catheter removed in the past 6 weeks and she is concerned 

that she might have an infection.  Per patient she's had since symptoms before 

with UTI.  She is concerned she has one.  She states that the pain is 7 out of 

10.  His been progressively getting worse for the past 3 days.  No dysuria or 

polyuria.  Per patient the urine does look infected.  She states that she used 

to get home health help at home but she has not had any in some time.  Denies 

any chest pain or shortness of breath.  No diarrhea issues.  No other medical 

issues at this time.  States that she does feel like she has fevers and chills.





PFSH


Past Medical History


Arthritis:  No


Asthma:  No


Autoimmune Disease:  No


Blood Disorders:  No


Anxiety:  No


Depression:  No


Heart Rhythm Problems:  No


Cancer:  No


Cardiovascular Problems:  Yes (HYPOTENSION)


Chemotherapy:  No


Chest Pain:  No


Congestive Heart Failure:  No


COPD:  No


Cerebrovascular Accident:  No


Diabetes:  No


Diminished Hearing:  No


Endocrine:  No


GERD:  No


Glaucoma:  No


Genitourinary:  Yes (UTI)


Headaches:  No


Hepatitis:  Yes


Hiatal Hernia:  No


Hypertension:  No


Immune Disorder:  No


Kidney Stones:  No


Musculoskeletal:  Yes


Neurologic:  Yes (PARAPLEGIC)


Psychiatric:  No


Reproductive:  No


Respiratory:  Yes (TB ; PE 11/10)


Immunizations Current:  Yes


Migraines:  No


Myocardial Infarction:  No


Radiation Therapy:  No


Renal Failure:  No


Seizures:  No


Sickle Cell Disease:  No


Sleep Apnea:  No


Thyroid Disease:  No


Ulcer:  No


PNEUMOCCOCAL Vaccine (Year):  1


Pregnant?:  Not Pregnant


:  4


Para:  3


Miscarriage:  1


Ovarian Cysts:  No


Tubal Ligation:  Yes





Past Surgical History


Abdominal Surgery:  No


AICD:  No


Appendectomy:  No


Arteriovenous Shunt:  No


Cardiac Surgery:  No


Cholecystectomy:  No


Ear Surgery:  No


Endocrine Surgery:  No


Eye Surgery:  No


Genitourinary Surgery:  No


Gynecologic Surgery:  Yes


Insulin Pump:  No


Joint Replacement:  No


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


Oral Surgery:  No


Pacemaker:  No


Thoracic Surgery:  No


Other Surgery:  Yes (MMESH TO PREVENT CLOTS)





Social History


Alcohol Use:  No


Tobacco Use:  Yes ( PPD)


Substance Use:  No





Allergies-Medications


(Allergen,Severity, Reaction):  


Coded Allergies:  


     Sulfa (Sulfonamide Antibiotics) (Unverified  Allergy, Intermediate, 

blisters, 18)


     levofloxacin (Unverified  Allergy, Intermediate, BLISTERS, 18)


     warfarin (Unverified  Adverse Reaction, Intermediate, UNABLE TO REGULATE, )


Reported Meds & Prescriptions





Reported Meds & Active Scripts


Active


Prednisone 10 Mg Tab 10 Mg PO DAILY


     TAKE THREE TABLETS BY MOUTH DAILY FOR THREE DAYS


     THEN TAKE TWO TABLETS BY MOUTH DAILY FOR THREE DAYS


     THEN TAKE ONE TABLET BY MOUTH DAILY FOR THREE DAYS.


Symbicort Inh (Budesonide/Formoterol Fumarate) 160-4.5 Mcg/Act Aero 1 Puff INH 

Q12HR


Cefdinir 300 Mg Cap 300 Mg PO BID


Azithromycin 500 Mg Tab 500 Mg PO DAILY


Famotidine 20 Mg Tab 20 Mg PO BID


Oxycodon-Acetaminophen 7.5-325 (Oxycodone HCl/Acetaminophen) 7.5 Mg-325 Mg 

Tablet 1 Tab PO Q6H PRN


Eq Nicotine (Nicotine) 14 Mg/24 Hour Dis 1 Patch T-DERMAL DAILY


Reported


Lyrica (Pregabalin) 50 Mg Cap 50 Mg PO BID


Xanax (Alprazolam) 1 Mg Tab 1 Mg PO Q8H PRN


Baclofen 20 Mg Tab 20 Mg PO TID


Effexor XR 24 HR (Venlafaxine HCl) 150 Mg Cap 150 Mg PO DAILY








Review of Systems


Except as stated in HPI:  all other systems reviewed are Neg





Physical Exam


Narrative


GENERAL: 


SKIN: Warm and dry.


HEAD: Atraumatic. Normocephalic. 


EYES: Pupils equal and round. No scleral icterus. No injection or drainage. 


ENT: No nasal bleeding or discharge.  Mucous membranes pink and moist.


NECK: Trachea midline. No JVD. 


CARDIOVASCULAR: Regular rate and rhythm.  


RESPIRATORY: No accessory muscle use. Clear to auscultation. Breath sounds 

equal bilaterally. 


GASTROINTESTINAL: Abdomen soft, tender in the lower abdomen especially on the 

right and left lower quadrant, nondistended. Hepatic and splenic margins not 

palpable. 


MUSCULOSKELETAL: Extremities without clubbing, cyanosis, or edema. No obvious 

deformities. 


NEUROLOGICAL: Awake and alert. No obvious cranial nerve deficits.  Motor 

grossly within normal limits. Five out of 5 muscle strength in the arms and 

legs.  Normal speech.


PSYCHIATRIC: Appropriate mood and affect; insight and judgment normal.





Data


Data


Last Documented VS





Vital Signs








  Date Time  Temp Pulse Resp B/P (MAP) Pulse Ox O2 Delivery O2 Flow Rate FiO2


 


18 13:10 98.2 88 18 118/78 (91) 99   








Orders





 Orders


Complete Blood Count With Diff (18 13:19)


Comprehensive Metabolic Panel (18 13:19)


Urinalysis - C+S If Indicated (18 13:19)


Lipase (18 13:19)


Sodium Chlor 0.9% 1000 Ml Inj (Ns 1000 M (18 14:15)


Lactic Acid Sepsis Protocol (18 14:13)





Labs





Laboratory Tests








Test


  18


14:00


 


White Blood Count 9.2 TH/MM3 


 


Red Blood Count 4.95 MIL/MM3 


 


Hemoglobin 14.5 GM/DL 


 


Hematocrit 43.2 % 


 


Mean Corpuscular Volume 87.2 FL 


 


Mean Corpuscular Hemoglobin 29.3 PG 


 


Mean Corpuscular Hemoglobin


Concent 33.6 % 


 


 


Red Cell Distribution Width 14.8 % 


 


Platelet Count 262 TH/MM3 


 


Mean Platelet Volume 8.9 FL 


 


Neutrophils (%) (Auto) 68.5 % 


 


Lymphocytes (%) (Auto) 22.8 % 


 


Monocytes (%) (Auto) 7.3 % 


 


Eosinophils (%) (Auto) 0.9 % 


 


Basophils (%) (Auto) 0.5 % 


 


Neutrophils # (Auto) 6.3 TH/MM3 


 


Lymphocytes # (Auto) 2.1 TH/MM3 


 


Monocytes # (Auto) 0.7 TH/MM3 


 


Eosinophils # (Auto) 0.1 TH/MM3 


 


Basophils # (Auto) 0.0 TH/MM3 


 


CBC Comment DIFF FINAL 


 


Differential Comment  











MDM


Medical Decision Making


Medical Screen Exam Complete:  Yes


Emergency Medical Condition:  Yes


Medical Record Reviewed:  Yes


Differential Diagnosis


UTI versus cystitis versus medical device issue versus acute abdomen


Narrative Course


53-year-old female that presents to the ED for evaluation of lower abdominal 

pain and possible urinary issues.  Patient was properly examined and was found 

to have signs and symptoms consistent appears to be a cystitis.  She does have 

a Ott catheter.  Labs were ordered.  Because patient was seen in the 

ambulance.  I cannot really evaluate the Ott or change at this time.  Patient 

will be moved to a medical bed where she will have more privacy and this 

procedure can be done.  Case will be signed out to my attending pending 

disposition and plan.











Jose Chin 2018 14:29

## 2018-02-21 NOTE — PD
Data


Data


Last Documented VS





Vital Signs








  Date Time  Temp Pulse Resp B/P (MAP) Pulse Ox O2 Delivery O2 Flow Rate FiO2


 


2/21/18 20:00  56 14 118/77 (91) 95   


 


2/21/18 13:10 98.2       








Orders





 Orders


Complete Blood Count With Diff (2/21/18 13:19)


Comprehensive Metabolic Panel (2/21/18 13:19)


Urinalysis - C+S If Indicated (2/21/18 13:19)


Lipase (2/21/18 13:19)


Sodium Chlor 0.9% 1000 Ml Inj (Ns 1000 M (2/21/18 14:15)


Lactic Acid Sepsis Protocol (2/21/18 14:13)


Morphine Inj (Morphine Inj) (2/21/18 15:30)


Ondansetron Inj (Zofran Inj) (2/21/18 15:30)


Morphine Inj (Morphine Inj) (2/21/18 17:15)


Urinary Catheter - Remove (2/21/18 17:10)


Urinary Catheter Insert/Apply (2/21/18 17:19)


Ceftriaxone Inj (Rocephin Inj) (2/21/18 19:00)


Urine Culture (2/21/18 18:45)


Ct Abd/Pel W Iv Contrast(Rout) (2/21/18 20:42)


Morphine Inj (Morphine Inj) (2/21/18 20:45)


Sodium Chlor 0.9% 1000 Ml Inj (Ns 1000 M (2/21/18 20:42)


Iohexol 350 Inj (Omnipaque 350 Inj) (2/21/18 21:40)





Labs





Laboratory Tests








Test


  2/21/18


14:00 2/21/18


15:30 2/21/18


18:45 2/21/18


19:45


 


White Blood Count 9.2 TH/MM3    


 


Red Blood Count 4.95 MIL/MM3    


 


Hemoglobin 14.5 GM/DL    


 


Hematocrit 43.2 %    


 


Mean Corpuscular Volume 87.2 FL    


 


Mean Corpuscular Hemoglobin 29.3 PG    


 


Mean Corpuscular Hemoglobin


Concent 33.6 % 


  


  


  


 


 


Red Cell Distribution Width 14.8 %    


 


Platelet Count 262 TH/MM3    


 


Mean Platelet Volume 8.9 FL    


 


Neutrophils (%) (Auto) 68.5 %    


 


Lymphocytes (%) (Auto) 22.8 %    


 


Monocytes (%) (Auto) 7.3 %    


 


Eosinophils (%) (Auto) 0.9 %    


 


Basophils (%) (Auto) 0.5 %    


 


Neutrophils # (Auto) 6.3 TH/MM3    


 


Lymphocytes # (Auto) 2.1 TH/MM3    


 


Monocytes # (Auto) 0.7 TH/MM3    


 


Eosinophils # (Auto) 0.1 TH/MM3    


 


Basophils # (Auto) 0.0 TH/MM3    


 


CBC Comment DIFF FINAL    


 


Differential Comment     


 


Lactic Acid Level  0.7 mmol/L   


 


Urine Color   LIGHT-YELLOW  


 


Urine Turbidity   HAZY  


 


Urine pH   7.0  


 


Urine Specific Gravity   1.003  


 


Urine Protein   NEG mg/dL  


 


Urine Glucose (UA)   NEG mg/dL  


 


Urine Ketones   NEG mg/dL  


 


Urine Occult Blood   TRACE  


 


Urine Nitrite   POS  


 


Urine Bilirubin   NEG  


 


Urine Urobilinogen


  


  


  LESS THAN 2.0


MG/DL 


 


 


Urine Leukocyte Esterase   LARGE  


 


Urine RBC   4 /hpf  


 


Urine WBC   20 /hpf  


 


Urine Squamous Epithelial


Cells 


  


  <1 /hpf 


  


 


 


Urine Amorphous Sediment   RARE  


 


Urine Bacteria   FEW /hpf  


 


Microscopic Urinalysis Comment


  


  


  CULTURE


INDICATED 


 


 


Blood Urea Nitrogen    6 MG/DL 


 


Creatinine    0.54 MG/DL 


 


Random Glucose    76 MG/DL 


 


Total Protein    6.7 GM/DL 


 


Albumin    3.1 GM/DL 


 


Calcium Level    8.5 MG/DL 


 


Alkaline Phosphatase    78 U/L 


 


Aspartate Amino Transf


(AST/SGOT) 


  


  


  27 U/L 


 


 


Alanine Aminotransferase


(ALT/SGPT) 


  


  


  23 U/L 


 


 


Total Bilirubin    0.2 MG/DL 


 


Sodium Level    145 MEQ/L 


 


Potassium Level    4.0 MEQ/L 


 


Chloride Level    112 MEQ/L 


 


Carbon Dioxide Level    24.9 MEQ/L 


 


Anion Gap    8 MEQ/L 


 


Estimat Glomerular Filtration


Rate 


  


  


  117 ML/MIN 


 


 


Lipase    1266 U/L 











MDM


Supervised Visit with CHRISTINE:  Yes


Narrative Course


I, Dr. Colon, have reviewed the advance practice practitioner's documentation 

and am in agreement, met with the patient face to face, made the diagnosis, and 

the medical decision making was done by me.  See their notes for further 

details.





Briefly this is a 53-year-old female with history of paraplegia secondary to an 

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 

chronic indwelling Ott catheter, here for evaluation of abdominal pain and 

requesting a Ott catheter change.  Patient reports that she usually has her 

catheter changed every 2 weeks, however this one has been in place for the last 

7 weeks.  Abdominal pain is diffuse, cramping, moderate, worse with movement 

and palpation.  On exam the patient is resting comfortably.  She does have some 

moderate diffuse abdominal tenderness without peritoneal signs.





Vital signs reviewed.





CBC is unremarkable.


CMP is essentially unremarkable.


Lipase is 1266.





CT abdomen pelvis:


CONCLUSION:     


1. Ott catheter within the bladder.


2. Degenerative and postsurgical changes in the hips.


3. Moderate amount of stool within the rectum but no definite abnormality to 

explain the lower, pain identified.





UA is suggestive of UTI.  The patient was given 1 g of IV Rocephin.





Patient was made aware of all findings.  She was provided a liter of normal 

saline IV and started on normal saline at 1 25 cc per hour.  She was also 

provided pain medication.  She will be admitted for further treatment and 

evaluation of acute pancreatitis.





Case discussed with hospitalist Dr. Vitale who will admit the patient to her 

service.


Diagnosis





 Primary Impression:  


 Pancreatitis


 Qualified Codes:  K85.90 - Acute pancreatitis without necrosis or infection, 

unspecified


 Additional Impression:  


 UTI (urinary tract infection)


 Qualified Codes:  N39.0 - Urinary tract infection, site not specified; R31.9 - 

Hematuria, unspecified











Tommy Colon MD Feb 21, 2018 22:09

## 2018-02-22 VITALS
TEMPERATURE: 98 F | HEART RATE: 56 BPM | SYSTOLIC BLOOD PRESSURE: 149 MMHG | DIASTOLIC BLOOD PRESSURE: 73 MMHG | OXYGEN SATURATION: 94 % | RESPIRATION RATE: 18 BRPM

## 2018-02-22 VITALS
SYSTOLIC BLOOD PRESSURE: 114 MMHG | RESPIRATION RATE: 14 BRPM | HEART RATE: 60 BPM | DIASTOLIC BLOOD PRESSURE: 68 MMHG | OXYGEN SATURATION: 95 %

## 2018-02-22 VITALS
TEMPERATURE: 97.9 F | OXYGEN SATURATION: 96 % | SYSTOLIC BLOOD PRESSURE: 135 MMHG | HEART RATE: 61 BPM | RESPIRATION RATE: 18 BRPM | DIASTOLIC BLOOD PRESSURE: 81 MMHG

## 2018-02-22 VITALS
DIASTOLIC BLOOD PRESSURE: 75 MMHG | OXYGEN SATURATION: 97 % | SYSTOLIC BLOOD PRESSURE: 145 MMHG | HEART RATE: 52 BPM | TEMPERATURE: 98 F | RESPIRATION RATE: 18 BRPM

## 2018-02-22 VITALS
SYSTOLIC BLOOD PRESSURE: 126 MMHG | OXYGEN SATURATION: 95 % | RESPIRATION RATE: 18 BRPM | HEART RATE: 61 BPM | TEMPERATURE: 97.9 F | DIASTOLIC BLOOD PRESSURE: 74 MMHG

## 2018-02-22 LAB
BASOPHILS # BLD AUTO: 0 TH/MM3 (ref 0–0.2)
BASOPHILS NFR BLD: 0.4 % (ref 0–2)
BUN SERPL-MCNC: 6 MG/DL (ref 7–18)
CALCIUM SERPL-MCNC: 8.2 MG/DL (ref 8.5–10.1)
CHLORIDE SERPL-SCNC: 113 MEQ/L (ref 98–107)
CREAT SERPL-MCNC: 0.43 MG/DL (ref 0.5–1)
EOSINOPHIL # BLD: 0.1 TH/MM3 (ref 0–0.4)
EOSINOPHIL NFR BLD: 1.4 % (ref 0–4)
ERYTHROCYTE [DISTWIDTH] IN BLOOD BY AUTOMATED COUNT: 14.1 % (ref 11.6–17.2)
GFR SERPLBLD BASED ON 1.73 SQ M-ARVRAT: 152 ML/MIN (ref 89–?)
GLUCOSE SERPL-MCNC: 73 MG/DL (ref 74–106)
HCO3 BLD-SCNC: 24 MEQ/L (ref 21–32)
HCT VFR BLD CALC: 36.9 % (ref 35–46)
HGB BLD-MCNC: 12.7 GM/DL (ref 11.6–15.3)
LYMPHOCYTES # BLD AUTO: 1.9 TH/MM3 (ref 1–4.8)
LYMPHOCYTES NFR BLD AUTO: 27.7 % (ref 9–44)
MCH RBC QN AUTO: 29.7 PG (ref 27–34)
MCHC RBC AUTO-ENTMCNC: 34.4 % (ref 32–36)
MCV RBC AUTO: 86.4 FL (ref 80–100)
MONOCYTE #: 0.6 TH/MM3 (ref 0–0.9)
MONOCYTES NFR BLD: 8.2 % (ref 0–8)
NEUTROPHILS # BLD AUTO: 4.3 TH/MM3 (ref 1.8–7.7)
NEUTROPHILS NFR BLD AUTO: 62.3 % (ref 16–70)
PLATELET # BLD: 183 TH/MM3 (ref 150–450)
PMV BLD AUTO: 8.3 FL (ref 7–11)
RBC # BLD AUTO: 4.27 MIL/MM3 (ref 4–5.3)
SODIUM SERPL-SCNC: 143 MEQ/L (ref 136–145)
WBC # BLD AUTO: 6.9 TH/MM3 (ref 4–11)

## 2018-02-22 RX ADMIN — THIAMINE HYDROCHLORIDE SCH MLS/HR: 100 INJECTION, SOLUTION INTRAMUSCULAR; INTRAVENOUS at 00:27

## 2018-02-22 RX ADMIN — ENOXAPARIN SODIUM SCH MG: 40 INJECTION SUBCUTANEOUS at 00:27

## 2018-02-22 RX ADMIN — Medication SCH ML: at 08:54

## 2018-02-22 RX ADMIN — Medication SCH ML: at 19:42

## 2018-02-22 RX ADMIN — THIAMINE HYDROCHLORIDE SCH MLS/HR: 100 INJECTION, SOLUTION INTRAMUSCULAR; INTRAVENOUS at 14:05

## 2018-02-22 RX ADMIN — ONDANSETRON PRN MG: 2 INJECTION, SOLUTION INTRAMUSCULAR; INTRAVENOUS at 14:36

## 2018-02-22 RX ADMIN — ONDANSETRON PRN MG: 2 INJECTION, SOLUTION INTRAMUSCULAR; INTRAVENOUS at 04:53

## 2018-02-22 RX ADMIN — THIAMINE HYDROCHLORIDE SCH MLS/HR: 100 INJECTION, SOLUTION INTRAMUSCULAR; INTRAVENOUS at 07:22

## 2018-02-22 RX ADMIN — MORPHINE SULFATE PRN MG: 2 INJECTION, SOLUTION INTRAMUSCULAR; INTRAVENOUS at 11:45

## 2018-02-22 RX ADMIN — THIAMINE HYDROCHLORIDE SCH MLS/HR: 100 INJECTION, SOLUTION INTRAMUSCULAR; INTRAVENOUS at 19:42

## 2018-02-22 RX ADMIN — MORPHINE SULFATE PRN MG: 2 INJECTION, SOLUTION INTRAMUSCULAR; INTRAVENOUS at 21:07

## 2018-02-22 RX ADMIN — MORPHINE SULFATE PRN MG: 2 INJECTION, SOLUTION INTRAMUSCULAR; INTRAVENOUS at 17:53

## 2018-02-22 RX ADMIN — MORPHINE SULFATE PRN MG: 2 INJECTION, SOLUTION INTRAMUSCULAR; INTRAVENOUS at 01:32

## 2018-02-22 RX ADMIN — CEFEPIME SCH MLS/HR: 2 INJECTION, POWDER, FOR SOLUTION INTRAVENOUS at 23:36

## 2018-02-22 RX ADMIN — MORPHINE SULFATE PRN MG: 2 INJECTION, SOLUTION INTRAMUSCULAR; INTRAVENOUS at 14:36

## 2018-02-22 RX ADMIN — MORPHINE SULFATE PRN MG: 2 INJECTION, SOLUTION INTRAMUSCULAR; INTRAVENOUS at 04:52

## 2018-02-22 RX ADMIN — ENOXAPARIN SODIUM SCH MG: 40 INJECTION SUBCUTANEOUS at 21:06

## 2018-02-22 RX ADMIN — MORPHINE SULFATE PRN MG: 2 INJECTION, SOLUTION INTRAMUSCULAR; INTRAVENOUS at 08:58

## 2018-02-22 NOTE — HHI.HP
__________________________________________________





[facility] [place]


Service


[facility]


Primary Care Physician


Alonso Arreguin M.D.


Admission Diagnosis





acute pancreatitis, UTI


Diagnoses:  


Travel History


International Travel<30 Days:  No


Contact w/Intl Traveler <30 Da:  No


Traveled to Known Affected Are:  No


History of Present Illness


53-year-old female with a past medical history significant for paraplegia 

secondary to epidural abscess and history of pulmonary embolus presents to the 

emergency department for evaluation of epigastric pain.  The patient reports a 

three-day history of increasingly worsening nausea and epigastric pain that 

radiates to her back.  She denies any emesis.  She endorses intermittent 

diaphoresis.  She denies fever/chills.  Denies chest pain or shortness of 

breath.  No diarrhea.





Review of Systems


Except as stated in HPI:  all other systems reviewed are Neg





Past Family Social History


Past Medical History


Paraplegia


History of PE


Past Surgical History


IVC filter placement


Bilateral tubal ligation


Reported Medications





Reported Meds & Active Scripts


Active


Prednisone 10 Mg Tab 10 Mg PO DAILY


     TAKE THREE TABLETS BY MOUTH DAILY FOR THREE DAYS


     THEN TAKE TWO TABLETS BY MOUTH DAILY FOR THREE DAYS


     THEN TAKE ONE TABLET BY MOUTH DAILY FOR THREE DAYS.


Symbicort Inh (Budesonide/Formoterol Fumarate) 160-4.5 Mcg/Act Aero 1 Puff INH 

Q12HR


Cefdinir 300 Mg Cap 300 Mg PO BID


Azithromycin 500 Mg Tab 500 Mg PO DAILY


Famotidine 20 Mg Tab 20 Mg PO BID


Oxycodon-Acetaminophen 7.5-325 (Oxycodone HCl/Acetaminophen) 7.5 Mg-325 Mg 

Tablet 1 Tab PO Q6H PRN


Eq Nicotine (Nicotine) 14 Mg/24 Hour Dis 1 Patch T-DERMAL DAILY


Reported


Lyrica (Pregabalin) 50 Mg Cap 50 Mg PO BID


Xanax (Alprazolam) 1 Mg Tab 1 Mg PO Q8H PRN


Baclofen 20 Mg Tab 20 Mg PO TID


Effexor XR 24 HR (Venlafaxine HCl) 150 Mg Cap 150 Mg PO DAILY


Allergies:  


Coded Allergies:  


     Sulfa (Sulfonamide Antibiotics) (Unverified  Allergy, Intermediate, 

blisters, 18)


     levofloxacin (Unverified  Allergy, Intermediate, BLISTERS, 18)


     warfarin (Unverified  Adverse Reaction, Intermediate, UNABLE TO REGULATE, )


Family History


Negative for CAD/DM


Social History


Smokes approximately one half pack per day.  Denies alcohol and illicit drugs.  

Has a history of IV drug abuse with last use approximately 9-10 years ago.





Physical Exam


Vital Signs





Vital Signs








  Date Time  Temp Pulse Resp B/P (MAP) Pulse Ox O2 Delivery O2 Flow Rate FiO2


 


18 20:00  56 14 118/77 (91) 95   


 


18 13:10 98.2 88 18 118/78 (91) 99   








Physical Exam


GENERAL:  female lying in bed


SKIN: No rashes, ecchymoses or lesions. Cool and dry.


HEAD: Atraumatic. Normocephalic. No temporal or scalp tenderness.


EYES: Pupils equal round and reactive. Extraocular motions intact. No scleral 

icterus. No injection or drainage. 


ENT: Nose without bleeding, purulent drainage or septal hematoma. Throat 

without erythema, tonsillar hypertrophy or exudate. Uvula midline. Airway 

patent.


NECK: Trachea midline. No JVD or lymphadenopathy. Supple, nontender, no 

meningeal signs.


CARDIOVASCULAR: Regular rate and rhythm without murmurs, gallops, or rubs. 


RESPIRATORY: Clear to auscultation. Breath sounds equal bilaterally. No wheezes

, rales, or rhonchi.  


GASTROINTESTINAL: Abdomen soft, tender to palpation worse in the mid epigastrium

, nondistended. No hepato-splenomegaly, or palpable masses. No guarding.


MUSCULOSKELETAL: Extremities without clubbing, cyanosis, or edema. No joint 

tenderness, effusion, or edema noted. No calf tenderness. 


NEUROLOGICAL: Awake and alert. Cranial nerves II through XII intact.  Normal 

speech.  Bilateral lower extremity paralysis.


Laboratory





Laboratory Tests








Test


  18


14:00 18


15:30 18


18:45 18


19:45


 


White Blood Count 9.2    


 


Red Blood Count 4.95    


 


Hemoglobin 14.5    


 


Hematocrit 43.2    


 


Mean Corpuscular Volume 87.2    


 


Mean Corpuscular Hemoglobin 29.3    


 


Mean Corpuscular Hemoglobin


Concent 33.6 


  


  


  


 


 


Red Cell Distribution Width 14.8    


 


Platelet Count 262    


 


Mean Platelet Volume 8.9    


 


Neutrophils (%) (Auto) 68.5    


 


Lymphocytes (%) (Auto) 22.8    


 


Monocytes (%) (Auto) 7.3    


 


Eosinophils (%) (Auto) 0.9    


 


Basophils (%) (Auto) 0.5    


 


Neutrophils # (Auto) 6.3    


 


Lymphocytes # (Auto) 2.1    


 


Monocytes # (Auto) 0.7    


 


Eosinophils # (Auto) 0.1    


 


Basophils # (Auto) 0.0    


 


CBC Comment DIFF FINAL    


 


Differential Comment     


 


Lactic Acid Level  0.7   


 


Urine Color   LIGHT-YELLOW  


 


Urine Turbidity   HAZY  


 


Urine pH   7.0  


 


Urine Specific Gravity   1.003  


 


Urine Protein   NEG  


 


Urine Glucose (UA)   NEG  


 


Urine Ketones   NEG  


 


Urine Occult Blood   TRACE  


 


Urine Nitrite   POS  


 


Urine Bilirubin   NEG  


 


Urine Urobilinogen   LESS THAN 2.0  


 


Urine Leukocyte Esterase   LARGE  


 


Urine RBC   4  


 


Urine WBC   20  


 


Urine Squamous Epithelial


Cells 


  


  <1 


  


 


 


Urine Amorphous Sediment   RARE  


 


Urine Bacteria   FEW  


 


Microscopic Urinalysis Comment


  


  


  CULTURE


INDICATED 


 


 


Blood Urea Nitrogen    6 


 


Creatinine    0.54 


 


Random Glucose    76 


 


Total Protein    6.7 


 


Albumin    3.1 


 


Calcium Level    8.5 


 


Alkaline Phosphatase    78 


 


Aspartate Amino Transf


(AST/SGOT) 


  


  


  27 


 


 


Alanine Aminotransferase


(ALT/SGPT) 


  


  


  23 


 


 


Total Bilirubin    0.2 


 


Sodium Level    145 


 


Potassium Level    4.0 


 


Chloride Level    112 


 


Carbon Dioxide Level    24.9 


 


Anion Gap    8 


 


Estimat Glomerular Filtration


Rate 


  


  


  117 


 


 


Lipase    1266 














 Date/Time


Source Procedure


Growth Status


 


 


 18 18:45


Urine Clean Catch Urine Culture


Pending Received








Result Diagram:  


18 1400                                                                   

             18








Caprini VTE Risk Assessment


Caprini VTE Risk Assessment:  Mod/High Risk (score >= 2)


Caprini Risk Assessment Model











 Point Value = 1          Point Value = 2  Point Value = 3  Point Value = 5


 


Age 41-60


Minor surgery


BMI > 25 kg/m2


Swollen legs


Varicose veins


Pregnancy or postpartum


History of unexplained or recurrent


   spontaneous 


Oral contraceptives or hormone


   replacement


Sepsis (< 1 month)


Serious lung disease, including


   pneumonia (< 1 month)


Abnormal pulmonary function


Acute myocardial infarction


Congestive heart failure (< 1 month)


History of inflammatory bowel disease


Medical patient at bed rest Age 61-74


Arthroscopic surgery


Major open surgery (> 45 min)


Laparoscopic surgery (> 45 min)


Malignancy


Confined to bed (> 72 hours)


Immobilizing plaster cast


Central venous access Age >= 75


History of VTE


Family history of VTE


Factor V Leiden


Prothrombin 03202Y


Lupus anticoagulant


Anticardiolipin antibodies


Elevated serum homocysteine


Heparin-induced thrombocytopenia


Other congenital or acquired


   thrombophilia Stroke (< 1 month)


Elective arthroplasty


Hip, pelvis, or leg fracture


Acute spinal cord injury (< 1 month)








Prophylaxis Regimen











   Total Risk


Factor Score Risk Level Prophylaxis Regimen


 


0-1      Low Early ambulation


 


2 Moderate Order ONE of the following:


*Sequential Compression Device (SCD)


*Heparin 5000 units SQ BID


 


3-4 Higher Order ONE of the following medications:


*Heparin 5000 units SQ TID


*Enoxaparin/Lovenox 40 mg SQ daily (WT < 150 kg, CrCl > 30 mL/min)


*Enoxaparin/Lovenox 30 mg SQ daily (WT < 150 kg, CrCl > 10-29 mL/min)


*Enoxaparin/Lovenox 30 mg SQ BID (WT < 150 kg, CrCl > 30 mL/min)


AND/OR


*Sequential Compression Device (SCD)


 


5 or more Highest Order ONE of the following medications:


*Heparin 5000 units SQ TID (Preferred with Epidurals)


*Enoxaparin/Lovenox 40 mg SQ daily (WT < 150 kg, CrCl > 30 mL/min)


*Enoxaparin/Lovenox 30 mg SQ daily (WT < 150 kg, CrCl > 10-29 mL/min)


*Enoxaparin/Lovenox 30 mg SQ BID (WT < 150 kg, CrCl > 30 mL/min)


AND


*Sequential Compression Device (SCD)











Assessment and Plan


Assessment and Plan


Assessment/plan:





1.  Pancreatitis


Lipase 1266


CT of the abdomen/pelvis negative for acute process


IV fluid hydration


Morphine for pain





2.  UTI


Patient with indwelling Ott catheter last changed 7 weeks ago


UA consistent with urinary tract infection


Urine culture pending


Rocephin





3.  History of PE


IVC filter in place





FEN


NPO


Electrolytes: Monitor and replete when necessary


NS at 150 cc/hour


Lovenox





Physician Certification


2 Midnight Certification Type:  Admission for Inpatient Services


Order for Inpatient Services


The services are ordered in accordance with Medicare regulations or non-

Medicare payer requirements, as applicable.  In the case of services not 

specified as inpatient-only, they are appropriately provided as inpatient 

services in accordance with the 2-midnight benchmark.


Estimated LOS (days):  2


2 days is the estimated time the patient will need to remain in the hospital, 

assuming treatment plan goals are met and no additional complications.


Post-Hospital Plan:  Not yet determined











Andria Vitale MD 2018 01:52

## 2018-02-22 NOTE — HHI.PR
Subjective


Remarks


States pain in epigastric region still persists.


Denies cp/sob.





Objective


Vitals





Vital Signs








  Date Time  Temp Pulse Resp B/P (MAP) Pulse Ox O2 Delivery O2 Flow Rate FiO2


 


2/22/18 15:54 97.9 61 18 135/81 (99) 96   


 


2/22/18 11:22 98.0 56 18 149/73 (98) 94   


 


2/22/18 08:29 97.9 61 18 126/74 (91) 95   


 


2/22/18 00:00  60 14 114/68 (83) 95 Room Air  


 


2/21/18 20:00  56 14 118/77 (91) 95   














I/O      


 


 2/21/18 2/21/18 2/21/18 2/22/18 2/22/18 2/22/18





 07:00 15:00 23:00 07:00 15:00 23:00


 


Intake Total   1000 ml 225 ml  


 


Balance   1000 ml 225 ml  


 


      


 


Intake IV Total   1000 ml 225 ml  








Result Diagram:  


2/22/18 0458 2/22/18 0458





Imaging





Last Impressions








Abdomen/Pelvis CT 2/21/18 2042 Signed





Impressions: 





 Service Date/Time:  Wednesday, February 21, 2018 21:34 - CONCLUSION:  1. Ott 





 catheter within the bladder. 2. Degenerative and postsurgical changes in the 





 hips. 3. Moderate amount of stool within the rectum but no definite 

abnormality 





 to explain the lower, pain identified.     Quentin Sung MD 








Objective Remarks


GENERAL:  female lying in bed


SKIN: No rashes, ecchymoses or lesions. Cool and dry.


HEAD: Atraumatic. Normocephalic. No temporal or scalp tenderness.


EYES: Pupils equal round and reactive. Extraocular motions intact. No scleral 

icterus. No injection or drainage. 


ENT: Nose without bleeding, purulent drainage or septal hematoma. Throat 

without erythema, tonsillar hypertrophy or exudate. Uvula midline. Airway 

patent.


NECK: Trachea midline. No JVD or lymphadenopathy. Supple, nontender, no 

meningeal signs.


CARDIOVASCULAR: Regular rate and rhythm without murmurs, gallops, or rubs. 


RESPIRATORY: Clear to auscultation. Breath sounds equal bilaterally. No wheezes

, rales, or rhonchi.  


GASTROINTESTINAL: Abdomen soft, tender to palpation worse in the mid epigastrium

, nondistended. No hepato-splenomegaly, or palpable masses. No guarding.


MUSCULOSKELETAL: Extremities without clubbing, cyanosis, or edema. No joint 

tenderness, effusion, or edema noted. No calf tenderness. 


NEUROLOGICAL: Awake and alert. Cranial nerves II through XII intact.  Normal 

speech.  Bilateral lower extremity paralysis.


Medications and IVs





Current Medications








 Medications


  (Trade)  Dose


 Ordered  Sig/Clarisa


 Route  Start Time


 Stop Time Status Last Admin


 


  (NS Flush)  2 ml  UNSCH  PRN


 IV FLUSH  2/21/18 22:15


     


 


 


  (NS Flush)  2 ml  BID


 IV FLUSH  2/22/18 09:00


    2/22/18 19:42


 


 


  (Zofran Inj)  4 mg  Q6H  PRN


 IVP  2/21/18 22:15


    2/22/18 14:36


 


 


  (Lovenox Inj)  40 mg  Q24H


 SQ  2/21/18 23:00


    2/22/18 21:06


 


 


  (Narcan Inj)  0.4 mg  UNSCH  PRN


 IV PUSH  2/21/18 22:15


     


 


 


 Ceftriaxone


 Sodium 1000 mg/


 Sodium Chloride  100 ml @ 


 200 mls/hr  Q24H


 IV  2/22/18 18:00


    2/22/18 17:38


 


 


  (Morphine Inj)  2 mg  Q3H  PRN


 IV PUSH  2/21/18 22:15


    2/22/18 21:07


 


 


  (Tylenol Supp)  650 mg  Q6H  PRN


 RECTAL  2/21/18 22:15


     


 


 


 Sodium Chloride  1,000 ml @ 


 150 mls/hr  Q6H40M


 IV  2/21/18 23:00


    2/22/18 19:42


 











A/P


Assessment and Plan


1.  Pancreatitis/Elevated lipase


Lipase 1266


CT of the abdomen/pelvis negative for acute process


IV fluid hydration


Morphine for pain


2/22 Lipase within normal limits. Consult GI for evaluation for EGD.





2.  UTI


Patient with indwelling Ott catheter last changed 7 weeks ago


UA consistent with urinary tract infection


Urine culture pending


Rocephin


2/22 Urine culture is growing pseudomonas. DC Rocephin and start Cefepime IV. 

Fu sensitivities.





3.  History of PE


IVC filter in place





FEN


NPO


Electrolytes: Monitor and replete when necessary


NS at 150 cc/hour


Lovenox


Discharge Planning


GI consulted.











Rocco Bautista MD Feb 22, 2018 18:05

## 2018-02-23 VITALS
SYSTOLIC BLOOD PRESSURE: 124 MMHG | TEMPERATURE: 97.9 F | DIASTOLIC BLOOD PRESSURE: 62 MMHG | RESPIRATION RATE: 18 BRPM | HEART RATE: 75 BPM | OXYGEN SATURATION: 99 %

## 2018-02-23 VITALS
OXYGEN SATURATION: 94 % | SYSTOLIC BLOOD PRESSURE: 119 MMHG | RESPIRATION RATE: 18 BRPM | TEMPERATURE: 98.7 F | HEART RATE: 69 BPM | DIASTOLIC BLOOD PRESSURE: 74 MMHG

## 2018-02-23 VITALS
DIASTOLIC BLOOD PRESSURE: 55 MMHG | TEMPERATURE: 98.2 F | RESPIRATION RATE: 20 BRPM | HEART RATE: 68 BPM | OXYGEN SATURATION: 96 % | SYSTOLIC BLOOD PRESSURE: 109 MMHG

## 2018-02-23 VITALS
SYSTOLIC BLOOD PRESSURE: 109 MMHG | OXYGEN SATURATION: 94 % | RESPIRATION RATE: 17 BRPM | DIASTOLIC BLOOD PRESSURE: 56 MMHG | HEART RATE: 72 BPM | TEMPERATURE: 98.1 F

## 2018-02-23 VITALS
DIASTOLIC BLOOD PRESSURE: 69 MMHG | SYSTOLIC BLOOD PRESSURE: 118 MMHG | TEMPERATURE: 98.4 F | OXYGEN SATURATION: 98 % | RESPIRATION RATE: 18 BRPM | HEART RATE: 66 BPM

## 2018-02-23 VITALS
TEMPERATURE: 98.1 F | SYSTOLIC BLOOD PRESSURE: 98 MMHG | HEART RATE: 88 BPM | DIASTOLIC BLOOD PRESSURE: 58 MMHG | OXYGEN SATURATION: 97 % | RESPIRATION RATE: 18 BRPM

## 2018-02-23 VITALS — SYSTOLIC BLOOD PRESSURE: 110 MMHG | DIASTOLIC BLOOD PRESSURE: 62 MMHG

## 2018-02-23 PROCEDURE — 0DB78ZX EXCISION OF STOMACH, PYLORUS, VIA NATURAL OR ARTIFICIAL OPENING ENDOSCOPIC, DIAGNOSTIC: ICD-10-PCS | Performed by: INTERNAL MEDICINE

## 2018-02-23 RX ADMIN — ENOXAPARIN SODIUM SCH MG: 40 INJECTION SUBCUTANEOUS at 23:20

## 2018-02-23 RX ADMIN — MORPHINE SULFATE PRN MG: 2 INJECTION, SOLUTION INTRAMUSCULAR; INTRAVENOUS at 02:56

## 2018-02-23 RX ADMIN — MORPHINE SULFATE PRN MG: 2 INJECTION, SOLUTION INTRAMUSCULAR; INTRAVENOUS at 23:20

## 2018-02-23 RX ADMIN — MORPHINE SULFATE PRN MG: 2 INJECTION, SOLUTION INTRAMUSCULAR; INTRAVENOUS at 16:53

## 2018-02-23 RX ADMIN — THIAMINE HYDROCHLORIDE SCH MLS/HR: 100 INJECTION, SOLUTION INTRAMUSCULAR; INTRAVENOUS at 02:55

## 2018-02-23 RX ADMIN — CEFEPIME SCH MLS/HR: 2 INJECTION, POWDER, FOR SOLUTION INTRAVENOUS at 07:47

## 2018-02-23 RX ADMIN — THIAMINE HYDROCHLORIDE SCH MLS/HR: 100 INJECTION, SOLUTION INTRAMUSCULAR; INTRAVENOUS at 07:47

## 2018-02-23 RX ADMIN — Medication SCH ML: at 20:09

## 2018-02-23 RX ADMIN — MORPHINE SULFATE PRN MG: 2 INJECTION, SOLUTION INTRAMUSCULAR; INTRAVENOUS at 05:53

## 2018-02-23 RX ADMIN — PANTOPRAZOLE SCH MG: 40 TABLET, DELAYED RELEASE ORAL at 20:09

## 2018-02-23 RX ADMIN — MORPHINE SULFATE PRN MG: 2 INJECTION, SOLUTION INTRAMUSCULAR; INTRAVENOUS at 14:15

## 2018-02-23 RX ADMIN — MORPHINE SULFATE PRN MG: 2 INJECTION, SOLUTION INTRAMUSCULAR; INTRAVENOUS at 08:47

## 2018-02-23 RX ADMIN — MORPHINE SULFATE PRN MG: 2 INJECTION, SOLUTION INTRAMUSCULAR; INTRAVENOUS at 20:10

## 2018-02-23 RX ADMIN — Medication SCH ML: at 05:53

## 2018-02-23 RX ADMIN — THIAMINE HYDROCHLORIDE SCH MLS/HR: 100 INJECTION, SOLUTION INTRAMUSCULAR; INTRAVENOUS at 16:54

## 2018-02-23 RX ADMIN — CEFEPIME SCH MLS/HR: 2 INJECTION, POWDER, FOR SOLUTION INTRAVENOUS at 16:53

## 2018-02-23 RX ADMIN — CEFEPIME SCH MLS/HR: 2 INJECTION, POWDER, FOR SOLUTION INTRAVENOUS at 23:20

## 2018-02-23 NOTE — GIPROC
[facility]

[address] [facility]. [facility], [address]

 

 

EGD PROCEDURE REPORT     EXAM DATE: 02/23/2018

 

PATIENT NAME:      Darling Campos           MR #:      [id number]

YOB: 1965      VISIT #:     [id number]

ATTENDING:     Nuha Hinds MD     ORDER #:     [id number]

ASSISTANT:      Janae Crane and William Mcgrath     STATUS:     inpatient

 

 

INDICATIONS:  The patient is a 53 yr old female here for an EGD due to

epigastric abdominal pain

PROCEDURE PERFORMED:     EGD w/ biopsy

MEDICATIONS:     None and Per Anesthesia.

TOPICAL ANESTHETIC:

 

CONSENT: The patient understands the risks and benefits of the procedure and

understands that these risks include, but are not limited to: sedation,

allergic reaction, infection, perforation and/or bleeding. Alternative means of

evaluation and treatment include, among others: physical exam, x-rays, and/or

surgical intervention. The patient elects to proceed with this endoscopic

procedure.

 



medical equipment was checked for proper function. Hand hygiene and appropriate

measures for infection prevention was taken. After the risks, benefits and

alternatives of the procedure were thoroughly explained, Informed consent was

verified, confirmed and timeout was successfully executed by the treatment

team. The patient was anesthetized with topical anesthesia and the Pentax

[id number] endoscope was introduced through the mouth and advanced to the second

portion of the duodenum.  Retroflexed views revealed a hiatal hernia  The

gastroscope was then slowly withdrawn and removed.

 

ESOPHAGUS: The mucosa of the esophagus appeared normal.

 

STOMACH: There was moderate and erosive gastritis in the gastric antrum.  A

biopsy was performed using cold forceps.  Sample sent for histology.

 

DUODENUM: The duodenal mucosa appeared normal in the bulb and second portion of

the duodenum.

 

 

 

ADVERSE EVENTS:     There were no complications.

IMPRESSIONS:     1.  The esophagus appeared normal

2.  There was gastritis in the gastric antrum; biopsy was performed

3.  Normal duodenal mucosa in the bulb and second portion of the duodenum

4.  Retroflexed views revealed a hiatal hernia

 

RECOMMENDATIONS:     1.  Await biopsy results.  Biopsy results will not be ready

for 7-10 days.  If you don't hear from us in two weeks, call our office for

biopsy results.

2.  Anti-reflux regimen

3.  Continue PPI

4.  Avoid NSAIDS

PATIENT CONDITION:     stable

DISPOSITION:     Inpatient

REPEAT EXAM:     Return 1 year EGD pending biopsy results

 

 

___________________________________

Nuha Hinds MD

eSigned:  Nuha Hinds MD 02/23/2018 12:41 PM

 

 

cc:

 

 

 

 

PATIENT NAME:  Darling Campos

MR#: [id number]

## 2018-02-23 NOTE — PD.CONS
HPI


History of Present Illness


This is a 53 year old F who presented to the ER two days ago with complaints of 

epigastric pain and nausea.  States epigastric pain has been intermittent, 

described as sharp, started three days prior to coming to the hospital.  Denies 

noticing any aggravating or alleviating factors.  Reports nausea has been 

constant, denies emesis.  Was found to have elevated lipase on admission, now 

WNL.  CT abdomen and pelvis W IV contrast (2/21) --> Moderate amount of stool 

within the rectum but no definite abnormality to explain the lower pain 

identified.  Pt reports she thought the pain was due to a UTI, gets frequent 

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  Pt denies 

history of EGD. Last colonoscopy was a few years ago, she states she was found 

to have a bowel blockage at that time that cleared after receiving a barium 

enema.  Denies ETOH.  Current half a pack a day smoker.  Does report taking 

Goody Powder daily, sometimes a few times a day for a long time.  


 (Mei Hernandez)





PFSH


Past Medical History


Paraplegia


History of PE


Past Surgical History


IVC filter placement


Bilateral tubal ligation


 (Mei Hernandez)


Coded Allergies:  


     Sulfa (Sulfonamide Antibiotics) (Unverified  Allergy, Intermediate, 

blisters, 2/21/18)


     levofloxacin (Unverified  Allergy, Intermediate, BLISTERS, 2/21/18)


     warfarin (Unverified  Adverse Reaction, Intermediate, UNABLE TO REGULATE, 2 /21/18)


Family History


Negative for CAD/DM


Social History


Smokes approximately one half pack per day.  Denies alcohol and illicit drugs.  

Has a history of IV drug abuse with last use approximately 9-10 years ago.


 (Mei Hernandez)





Review of Systems


Gastrointestinal:  COMPLAINS OF: Abdominal pain, Constipation, Nausea, DENIES: 

Black stools, Bloody stools, Diarrhea, Vomiting, Difficulty Swallowing, 

Odynophagia, Swelling of Abdomen, Heartburn, Hematemesis (Mei Hernandez)





GI Exam


Vitals I&O





Vital Signs








  Date Time  Temp Pulse Resp B/P (MAP) Pulse Ox O2 Delivery O2 Flow Rate FiO2


 


2/23/18 08:35 98.2 68 20 109/55 (73) 96   


 


2/23/18 06:11   18     


 


2/23/18 03:55 98.4 66 18 118/69 (85) 98   


 


2/23/18 00:16 98.7 69 18 119/74 (89) 94   


 


2/22/18 20:56 98.0 52 18 145/75 (98) 97   


 


2/22/18 15:54 97.9 61 18 135/81 (99) 96   


 


2/22/18 11:22 98.0 56 18 149/73 (98) 94   














I/O      


 


 2/22/18 2/22/18 2/22/18 2/23/18 2/23/18 2/23/18





 07:00 15:00 23:00 07:00 15:00 23:00


 


Intake Total 225 ml   200 ml 900 ml 


 


Output Total   1150 ml   


 


Balance 225 ml  -1150 ml 200 ml 900 ml 


 


      


 


Intake Oral    200 ml  


 


IV Total 225 ml    900 ml 


 


Output Urine Total   1150 ml   








Imaging





Last Impressions








Abdomen/Pelvis CT 2/21/18 2042 Signed





Impressions: 





 Service Date/Time:  Wednesday, February 21, 2018 21:34 - CONCLUSION:  1. Ott 





 catheter within the bladder. 2. Degenerative and postsurgical changes in the 





 hips. 3. Moderate amount of stool within the rectum but no definite 

abnormality 





 to explain the lower, pain identified.     Quentin Sung MD 








Laboratory











 Date/Time


Source Procedure


Growth Status


 


 


 2/21/18 18:45


Urine Clean Catch Urine Culture - Preliminary


Pseudomonas Species Resulted








Physical Examination


HEENT: Normocephalic; atraumatic


CHEST:  Even/unlabored


CARDIAC:  RRR


ABDOMEN:  Soft, nondistended, epigastric TTP, bowel sounds active 


EXTREMITIES: Contracted at wrists of upper extremities, paralyzed below waist 


SKIN:  Normal; no rash; no jaundice.


CNS:  Alert and oriented times three.


 (Mei Hernandez)





Assessment and Plan


Plan


Assessment:


- Epigastric pain- started three days prior to arrival, intermittent, described


  as sharp. Denies aggravating or alleviating factors.  Associated nausea,


  constant, denies emesis.  Has never had EGD.  Denies ETOH, Current


  half a pack a day smoker.  Also reports taking Goody Powder daily, sometimes


  a few time a day.  


- Acute pancreatitis- lipase initially 1266 now WNL. 


- Constipation- chronic issue, paralyzed- CT abdomen and pelvis W IV contrast 


  (2/21) --> Moderate amount of stool within the rectum but no definite 

abnormality 


  to explain the lower pain identified. 


- History of PE- IVC filter- Lovenox 1 24 hrs, last dose was last night


- Paraplegia secondary to epidural abscess 


- UTI- frequent- indwelling Ott catheter 





Plan:


EGD today


Obtain consent


Keep pt NPO 


Protonix


Miralax


Further recommendations based on clinical course and results of above





Pt has been seen and examined by myself and Dr. Hinds and this note is 

written on his behalf 


 (Mei Hernandez)


Physician Comments


Seen and examined with JERE, EGD planned for today. Bowel regimen. Dr. Kay 

to follow. Thank you


 (Nuha Hinds MD)











Mei Hernandez Feb 23, 2018 09:46


Nuha Hinds MD Feb 23, 2018 19:31

## 2018-02-23 NOTE — HHI.PR
Subjective


Remarks


Patiemt states epigastric pain still present but improving.


Denies nausea or vomiting


Afebrile





Objective


Vitals





Vital Signs








  Date Time  Temp Pulse Resp B/P (MAP) Pulse Ox O2 Delivery O2 Flow Rate FiO2


 


2/23/18 08:35 98.2 68 20 109/55 (73) 96   


 


2/23/18 06:11   18     


 


2/23/18 03:55 98.4 66 18 118/69 (85) 98   


 


2/23/18 00:16 98.7 69 18 119/74 (89) 94   


 


2/22/18 20:56 98.0 52 18 145/75 (98) 97   


 


2/22/18 15:54 97.9 61 18 135/81 (99) 96   














I/O      


 


 2/22/18 2/22/18 2/22/18 2/23/18 2/23/18 2/23/18





 07:00 15:00 23:00 07:00 15:00 23:00


 


Intake Total 225 ml   200 ml 1000 ml 


 


Output Total   1150 ml   


 


Balance 225 ml  -1150 ml 200 ml 1000 ml 


 


      


 


Intake Oral    200 ml  


 


IV Total 225 ml    1000 ml 


 


Output Urine Total   1150 ml   








Result Diagram:  


2/22/18 0458 2/22/18 0458





Objective Remarks


GENERAL:  female lying in bed


SKIN: No rashes, ecchymoses or lesions. Cool and dry.


HEAD: Atraumatic. Normocephalic. No temporal or scalp tenderness.


EYES: Pupils equal round and reactive. Extraocular motions intact. No scleral 

icterus. No injection or drainage. 


ENT: Nose without bleeding, purulent drainage or septal hematoma. Throat 

without erythema, tonsillar hypertrophy or exudate. Uvula midline. Airway 

patent.


NECK: Trachea midline. No JVD or lymphadenopathy. Supple, nontender, no 

meningeal signs.


CARDIOVASCULAR: Regular rate and rhythm without murmurs, gallops, or rubs. 


RESPIRATORY: Clear to auscultation. Breath sounds equal bilaterally. No wheezes

, rales, or rhonchi.  


GASTROINTESTINAL: Abdomen soft, tender to palpation worse in the mid epigastrium

, nondistended. No hepato-splenomegaly, or palpable masses. No guarding.


MUSCULOSKELETAL: Extremities without clubbing, cyanosis, or edema. No joint 

tenderness, effusion, or edema noted. No calf tenderness. 


NEUROLOGICAL: Awake and alert. Cranial nerves II through XII intact.  Normal 

speech.  Bilateral lower extremity paralysis.


Medications and IVs





Current Medications








 Medications


  (Trade)  Dose


 Ordered  Sig/Clarisa


 Route  Start Time


 Stop Time Status Last Admin


 


  (NS Flush)  2 ml  UNSCH  PRN


 IV FLUSH  2/21/18 22:15


     


 


 


  (NS Flush)  2 ml  BID


 IV FLUSH  2/22/18 09:00


    2/23/18 05:53


 


 


  (Zofran Inj)  4 mg  Q6H  PRN


 IVP  2/21/18 22:15


    2/22/18 14:36


 


 


  (Lovenox Inj)  40 mg  Q24H


 SQ  2/21/18 23:00


    2/22/18 21:06


 


 


  (Narcan Inj)  0.4 mg  UNSCH  PRN


 IV PUSH  2/21/18 22:15


     


 


 


  (Morphine Inj)  2 mg  Q3H  PRN


 IV PUSH  2/21/18 22:15


    2/23/18 14:15


 


 


  (Tylenol Supp)  650 mg  Q6H  PRN


 RECTAL  2/21/18 22:15


     


 


 


 Sodium Chloride  1,000 ml @ 


 150 mls/hr  Q6H40M


 IV  2/21/18 23:00


    2/23/18 07:47


 


 


 Cefepime HCl 2000


 mg/Sodium Chloride  100 ml @ 


 200 mls/hr  Q8H


 IV  2/23/18 00:00


    2/23/18 07:47


 


 


  (Protonix)  40 mg  Q12HR


 PO  2/23/18 21:00


     


 











A/P


Assessment and Plan


1.  Pancreatitis/Elevated lipase


Lipase 1266


CT of the abdomen/pelvis negative for acute process


IV fluid hydration


Morphine for pain


2/22 Lipase within normal limits. Consult GI for evaluation for EGD.


2/23 For EGD today.





2.  UTI


Patient with indwelling Ott catheter last changed 7 weeks ago


UA consistent with urinary tract infection


Urine culture pending


Rocephin


 Urine culture is growing pseudomonas. DC Rocephin and start Cefepime IV. Fu 

sensitivities.





3.  History of PE


IVC filter in place





4 constipation


CT abdomen and pelvis showed moderate amount of stool within the rectum but no 

definite abnormality.


MiraLAX.


GI consulted.





FEN


NPO


Electrolytes: Monitor and replete when necessary


NS at 150 cc/hour


Lovenox


Discharge Planning


GI consulted.











Rocco Bautista MD Feb 23, 2018 11:42

## 2018-02-24 VITALS
SYSTOLIC BLOOD PRESSURE: 127 MMHG | OXYGEN SATURATION: 99 % | DIASTOLIC BLOOD PRESSURE: 79 MMHG | TEMPERATURE: 97.5 F | RESPIRATION RATE: 18 BRPM | HEART RATE: 65 BPM

## 2018-02-24 VITALS
TEMPERATURE: 96.7 F | SYSTOLIC BLOOD PRESSURE: 148 MMHG | OXYGEN SATURATION: 99 % | DIASTOLIC BLOOD PRESSURE: 91 MMHG | RESPIRATION RATE: 18 BRPM | HEART RATE: 63 BPM

## 2018-02-24 VITALS
SYSTOLIC BLOOD PRESSURE: 128 MMHG | TEMPERATURE: 98.1 F | DIASTOLIC BLOOD PRESSURE: 70 MMHG | RESPIRATION RATE: 17 BRPM | OXYGEN SATURATION: 95 % | HEART RATE: 78 BPM

## 2018-02-24 VITALS
HEART RATE: 89 BPM | DIASTOLIC BLOOD PRESSURE: 61 MMHG | OXYGEN SATURATION: 94 % | SYSTOLIC BLOOD PRESSURE: 95 MMHG | TEMPERATURE: 99.1 F | RESPIRATION RATE: 17 BRPM

## 2018-02-24 VITALS — DIASTOLIC BLOOD PRESSURE: 74 MMHG | SYSTOLIC BLOOD PRESSURE: 119 MMHG | HEART RATE: 78 BPM

## 2018-02-24 VITALS
SYSTOLIC BLOOD PRESSURE: 152 MMHG | OXYGEN SATURATION: 98 % | DIASTOLIC BLOOD PRESSURE: 76 MMHG | RESPIRATION RATE: 18 BRPM | HEART RATE: 61 BPM | TEMPERATURE: 97 F

## 2018-02-24 VITALS
OXYGEN SATURATION: 97 % | SYSTOLIC BLOOD PRESSURE: 148 MMHG | RESPIRATION RATE: 18 BRPM | HEART RATE: 72 BPM | TEMPERATURE: 98.5 F | DIASTOLIC BLOOD PRESSURE: 81 MMHG

## 2018-02-24 VITALS
SYSTOLIC BLOOD PRESSURE: 137 MMHG | TEMPERATURE: 97.2 F | OXYGEN SATURATION: 98 % | HEART RATE: 67 BPM | DIASTOLIC BLOOD PRESSURE: 86 MMHG | RESPIRATION RATE: 18 BRPM

## 2018-02-24 RX ADMIN — MORPHINE SULFATE PRN MG: 2 INJECTION, SOLUTION INTRAMUSCULAR; INTRAVENOUS at 08:38

## 2018-02-24 RX ADMIN — CEFEPIME SCH MLS/HR: 2 INJECTION, POWDER, FOR SOLUTION INTRAVENOUS at 10:20

## 2018-02-24 RX ADMIN — ENOXAPARIN SODIUM SCH MG: 40 INJECTION SUBCUTANEOUS at 21:31

## 2018-02-24 RX ADMIN — MORPHINE SULFATE PRN MG: 2 INJECTION, SOLUTION INTRAMUSCULAR; INTRAVENOUS at 21:32

## 2018-02-24 RX ADMIN — Medication SCH ML: at 08:54

## 2018-02-24 RX ADMIN — THIAMINE HYDROCHLORIDE SCH MLS/HR: 100 INJECTION, SOLUTION INTRAMUSCULAR; INTRAVENOUS at 02:08

## 2018-02-24 RX ADMIN — THIAMINE HYDROCHLORIDE SCH MLS/HR: 100 INJECTION, SOLUTION INTRAMUSCULAR; INTRAVENOUS at 11:00

## 2018-02-24 RX ADMIN — CEFEPIME SCH MLS/HR: 2 INJECTION, POWDER, FOR SOLUTION INTRAVENOUS at 23:50

## 2018-02-24 RX ADMIN — Medication SCH ML: at 21:36

## 2018-02-24 RX ADMIN — STANDARDIZED SENNA CONCENTRATE AND DOCUSATE SODIUM SCH TAB: 8.6; 5 TABLET, FILM COATED ORAL at 21:30

## 2018-02-24 RX ADMIN — CEFEPIME SCH MLS/HR: 2 INJECTION, POWDER, FOR SOLUTION INTRAVENOUS at 17:29

## 2018-02-24 RX ADMIN — MORPHINE SULFATE PRN MG: 2 INJECTION, SOLUTION INTRAMUSCULAR; INTRAVENOUS at 02:19

## 2018-02-24 RX ADMIN — MORPHINE SULFATE PRN MG: 2 INJECTION, SOLUTION INTRAMUSCULAR; INTRAVENOUS at 18:17

## 2018-02-24 RX ADMIN — THIAMINE HYDROCHLORIDE SCH MLS/HR: 100 INJECTION, SOLUTION INTRAMUSCULAR; INTRAVENOUS at 04:20

## 2018-02-24 RX ADMIN — MORPHINE SULFATE PRN MG: 2 INJECTION, SOLUTION INTRAMUSCULAR; INTRAVENOUS at 15:15

## 2018-02-24 RX ADMIN — PANTOPRAZOLE SCH MG: 40 TABLET, DELAYED RELEASE ORAL at 08:41

## 2018-02-24 RX ADMIN — MORPHINE SULFATE PRN MG: 2 INJECTION, SOLUTION INTRAMUSCULAR; INTRAVENOUS at 05:30

## 2018-02-24 RX ADMIN — MORPHINE SULFATE PRN MG: 2 INJECTION, SOLUTION INTRAMUSCULAR; INTRAVENOUS at 12:06

## 2018-02-24 RX ADMIN — PANTOPRAZOLE SCH MG: 40 TABLET, DELAYED RELEASE ORAL at 21:30

## 2018-02-24 NOTE — HHI.PR
Subjective


Remarks


Patient still c/o abdominal pain.


Denies fevers or chills.


states had a BM 30 minutes ago with some relieve in pain.





Objective


Vitals





Vital Signs








  Date Time  Temp Pulse Resp B/P (MAP) Pulse Ox O2 Delivery O2 Flow Rate FiO2


 


2/24/18 16:00 96.7 63 18 148/91 (110) 99   


 


2/24/18 15:20   18     


 


2/24/18 12:00 98.5 72 18 148/81 (103) 97   


 


2/24/18 08:00 97.2 67 18 137/86 (103) 98   


 


2/24/18 03:35 98.1 78 17 128/70 (89) 95   


 


2/24/18 02:17  78  119/74 (89)    


 


2/24/18 00:00 99.1 89 17 95/61 (72) 94   


 


2/23/18 19:47 98.1 88 18 98/58 (71) 97   














I/O      


 


 2/23/18 2/23/18 2/23/18 2/24/18 2/24/18 2/24/18





 07:00 15:00 23:00 07:00 15:00 23:00


 


Intake Total 200 ml 2100 ml 1940 ml 240 ml 750 ml 


 


Output Total  325 ml 1200 ml 1250 ml 2125 ml 


 


Balance 200 ml 1775 ml 740 ml -1010 ml -1375 ml 


 


      


 


Intake Oral 200 ml  740 ml 240 ml 750 ml 


 


IV Total  1900 ml 1200 ml   


 


Other  200 ml    


 


Output Urine Total  325 ml 1200 ml 1250 ml 2125 ml 


 


# Bowel Movements   0 0 1 








Result Diagram:  


2/22/18 0458 2/22/18 0458





Imaging





Last Impressions








Abdomen/Pelvis CT 2/21/18 2042 Signed





Impressions: 





 Service Date/Time:  Wednesday, February 21, 2018 21:34 - CONCLUSION:  1. Ott 





 catheter within the bladder. 2. Degenerative and postsurgical changes in the 





 hips. 3. Moderate amount of stool within the rectum but no definite 

abnormality 





 to explain the lower, pain identified.     Quentin Sung MD 








Objective Remarks


GENERAL:  female lying in bed


SKIN: No rashes, ecchymoses or lesions. Cool and dry.


HEAD: Atraumatic. Normocephalic. No temporal or scalp tenderness.


EYES: Pupils equal round and reactive. Extraocular motions intact. No scleral 

icterus. No injection or drainage. 


ENT: Nose without bleeding, purulent drainage or septal hematoma. Throat 

without erythema, tonsillar hypertrophy or exudate. Uvula midline. Airway 

patent.


NECK: Trachea midline. No JVD or lymphadenopathy. Supple, nontender, no 

meningeal signs.


CARDIOVASCULAR: Regular rate and rhythm without murmurs, gallops, or rubs. 


RESPIRATORY: Clear to auscultation. Breath sounds equal bilaterally. No wheezes

, rales, or rhonchi.  


GASTROINTESTINAL: Abdomen soft, tender to palpation worse in the mid epigastrium

, nondistended. No hepato-splenomegaly, or palpable masses. No guarding.


MUSCULOSKELETAL: Extremities without clubbing, cyanosis, or edema. No joint 

tenderness, effusion, or edema noted. No calf tenderness. 


NEUROLOGICAL: Awake and alert. Cranial nerves II through XII intact.  Normal 

speech.  Bilateral lower extremity paralysis.


Medications and IVs





Current Medications








 Medications


  (Trade)  Dose


 Ordered  Sig/Clarisa


 Route  Start Time


 Stop Time Status Last Admin


 


  (NS Flush)  2 ml  UNSCH  PRN


 IV FLUSH  2/21/18 22:15


     


 


 


  (NS Flush)  2 ml  BID


 IV FLUSH  2/22/18 09:00


    2/23/18 20:09


 


 


  (Zofran Inj)  4 mg  Q6H  PRN


 IVP  2/21/18 22:15


    2/22/18 14:36


 


 


  (Lovenox Inj)  40 mg  Q24H


 SQ  2/21/18 23:00


    2/23/18 23:20


 


 


  (Narcan Inj)  0.4 mg  UNSCH  PRN


 IV PUSH  2/21/18 22:15


     


 


 


  (Morphine Inj)  2 mg  Q3H  PRN


 IV PUSH  2/21/18 22:15


    2/24/18 15:15


 


 


  (Tylenol Supp)  650 mg  Q6H  PRN


 RECTAL  2/21/18 22:15


     


 


 


 Sodium Chloride  1,000 ml @ 


 150 mls/hr  Q6H40M


 IV  2/21/18 23:00


    2/24/18 11:00


 


 


 Cefepime HCl 2000


 mg/Sodium Chloride  100 ml @ 


 200 mls/hr  Q8H


 IV  2/23/18 00:00


    2/24/18 17:29


 


 


  (Protonix)  40 mg  Q12HR


 PO  2/23/18 21:00


    2/24/18 08:41


 











A/P


Problem List:  


(1) Abdominal pain


ICD Code:  R10.9 - Unspecified abdominal pain


Status:  Acute


(2) Pancreatitis


ICD Code:  K85.90 - Acute pancreatitis without necrosis or infection, 

unspecified


Status:  Resolved


(3) UTI (urinary tract infection)


ICD Code:  N39.0 - Urinary tract infection, site not specified


Status:  Acute


(4) Paraplegia


ICD Code:  G82.20 - Paraplegia, unspecified


Status:  Chronic


Assessment and Plan


1.  Pancreatitis/Elevated lipase


Lipase 1266


CT of the abdomen/pelvis negative for acute process


IV fluid hydration


Morphine for pain


2/22 Lipase within normal limits. Consult GI for evaluation for EGD.


2/24 patient is a status post EGD with findings of gastritis, gastric erosion 

and hiatal hernia.  The patient however still complaining of abdominal pain.  

Continue PPI.  Continue to follow-up after discharge recommendations.  Continue 

pain control with IV morphine.  DC IV fluids.





2.  UTI


Patient with indwelling Ott catheter last changed 7 weeks ago


UA consistent with urinary tract infection


Urine culture pending


Rocephin


Urine culture is growing pseudomonas. DC Rocephin and start Cefepime IV. Fu 

sensitivities.





3.  History of PE


IVC filter in place





4 constipation


CT abdomen and pelvis showed moderate amount of stool within the rectum but no 

definite abnormality.


MiraLAX.


GI consulted.


2/24 patient had a bowel movement today.  Status post magnesium citrate.  I 

will place on senna and Colace.





FEN


NPO


Electrolytes: Monitor and replete when necessary


Lovenox


Discharge Planning


GI consulted.





Problem Qualifiers





(1) Abdominal pain:  


Qualified Codes:  R10.13 - Epigastric pain


(2) Pancreatitis:  


Qualified Codes:  K85.90 - Acute pancreatitis without necrosis or infection, 

unspecified


(3) UTI (urinary tract infection):  


Qualified Codes:  N39.0 - Urinary tract infection, site not specified








Rocco Bautista MD Feb 24, 2018 17:52

## 2018-02-24 NOTE — HHI.GIFU
Subjective


Remarks


Abdominal pain continues


Anxiety increased with pain


Resting in the bed


Chief concern today is getting pain management under control


 (Lynsey Phillips)





Objective


Vitals I&O





Vital Signs








  Date Time  Temp Pulse Resp B/P (MAP) Pulse Ox O2 Delivery O2 Flow Rate FiO2


 


2/24/18 08:43   18     


 


2/24/18 08:00 97.2 67 18 137/86 (103) 98   


 


2/24/18 03:35 98.1 78 17 128/70 (89) 95   


 


2/24/18 02:17  78  119/74 (89)    


 


2/24/18 00:00 99.1 89 17 95/61 (72) 94   


 


2/23/18 19:47 98.1 88 18 98/58 (71) 97   


 


2/23/18 15:23 97.9 75 18 124/62 (82) 99   


 


2/23/18 14:12    110/62 (78)    


 


2/23/18 13:20 97.6 69 16 106/66 (79) 100   


 


2/23/18 12:50 96.7 85 18 90/55 (67) 100   














I/O      


 


 2/23/18 2/23/18 2/23/18 2/24/18 2/24/18 2/24/18





 07:00 15:00 23:00 07:00 15:00 23:00


 


Intake Total 200 ml 2100 ml 1940 ml 240 ml  


 


Output Total  325 ml 1200 ml 1250 ml  


 


Balance 200 ml 1775 ml 740 ml -1010 ml  


 


      


 


Intake Oral 200 ml  740 ml 240 ml  


 


IV Total  1900 ml 1200 ml   


 


Other  200 ml    


 


Output Urine Total  325 ml 1200 ml 1250 ml  


 


# Bowel Movements   0 0  








Laboratory











 Date/Time


Source Procedure


Growth Status


 


 


 2/21/18 18:45


Urine Clean Catch Urine Culture - Final Complete








Imaging





Last Impressions








Abdomen/Pelvis CT 2/21/18 2042 Signed





Impressions: 





 Service Date/Time:  Wednesday, February 21, 2018 21:34 - CONCLUSION:  1. Ott 





 catheter within the bladder. 2. Degenerative and postsurgical changes in the 





 hips. 3. Moderate amount of stool within the rectum but no definite 

abnormality 





 to explain the lower, pain identified.     Quentin Sung MD 








Physical Exam


HEENT: Pupils round and reactive to light; normocephalic; atraumatic; no 

jaundice.  Throat is clear.


NECK: Neck is supple, no JVD, no lymphadenopathy.


CHEST:  Chest is clear to auscultation and percussion.


CARDIAC:  Regular rate and rhythm with no murmur gallop or rubs.


ABDOMEN:  Taut, nondistended, generalized tenderness no hepatosplenomegaly; 

bowel sounds are present in all four quadrants.


EXTREMITIES: No clubbing, cyanosis, or edema.,  Paraplegia, does not move lower 

extremities


SKIN:  Normal; no rash; no jaundice.,  No obvious rashes


CNS:  No focal deficits; alert and oriented times three.


 (Lynsey Phillips)





Assessment and Plan


Plan


Assessment: history


- Epigastric pain- started three days prior to arrival, intermittent, described


  as sharp. Denies aggravating or alleviating factors.  Associated nausea,


  constant, denies emesis.  Has never had EGD.  Denies ETOH, Current


  half a pack a day smoker.  Also reports taking Goody Powder daily, sometimes


  a few time a day.  


- Acute pancreatitis- lipase initially 1266 , now 317


- Constipation- chronic issue, paralyzed- CT abdomen and pelvis W IV contrast 


  (2/21) --> Moderate amount of stool within the rectum but no definite 

abnormality 


  to explain the lower pain identified.  Discussed the need for bowel movement 

today even though patient has not been eating much.  No BM since admission to 

the hospital


- History of PE- IVC filter- Lovenox 1 24 hrs, last dose was last night


- Paraplegia secondary to epidural abscess 


- UTI- frequent- indwelling Ott catheter , chronic secondary to her paraplegia


EGD done on 02/23/18, normal esophagus, gastritis in the gastric antrum, hiatal 

hernia








Plan:


Diet as tolerated


Await biopsies from EGD


PPI


Avoid NSAIDs


Monitor labs


Mag citrate 1 today


Check for impaction


Protonix


Miralax


Further recommendations based on clinical course and results of above





Pt has been seen and examined by myself and Dr. Kay, note is written on 

his behalf 


 (Lynsey Phillips)


Plan


Patient was seen and examined, agree with above note, pt complaimimg about the 

foly, overall feels better, await Bx results 


 (Sneha Kay MD)











Lynsey Phillips Feb 24, 2018 12:25


Sneha Kay MD Feb 24, 2018 18:35

## 2018-02-25 VITALS
TEMPERATURE: 97.3 F | SYSTOLIC BLOOD PRESSURE: 166 MMHG | RESPIRATION RATE: 18 BRPM | OXYGEN SATURATION: 100 % | HEART RATE: 60 BPM | DIASTOLIC BLOOD PRESSURE: 92 MMHG

## 2018-02-25 VITALS
HEART RATE: 83 BPM | TEMPERATURE: 98.2 F | RESPIRATION RATE: 18 BRPM | OXYGEN SATURATION: 93 % | SYSTOLIC BLOOD PRESSURE: 112 MMHG | DIASTOLIC BLOOD PRESSURE: 70 MMHG

## 2018-02-25 VITALS
DIASTOLIC BLOOD PRESSURE: 76 MMHG | OXYGEN SATURATION: 98 % | SYSTOLIC BLOOD PRESSURE: 121 MMHG | RESPIRATION RATE: 17 BRPM | HEART RATE: 74 BPM | TEMPERATURE: 97.2 F

## 2018-02-25 LAB
ALBUMIN SERPL-MCNC: 3.2 GM/DL (ref 3.4–5)
ALP SERPL-CCNC: 66 U/L (ref 45–117)
ALT SERPL-CCNC: 29 U/L (ref 10–53)
AST SERPL-CCNC: 29 U/L (ref 15–37)
BILIRUB SERPL-MCNC: 0.4 MG/DL (ref 0.2–1)
BUN SERPL-MCNC: 4 MG/DL (ref 7–18)
CALCIUM SERPL-MCNC: 8.8 MG/DL (ref 8.5–10.1)
CHLORIDE SERPL-SCNC: 107 MEQ/L (ref 98–107)
CREAT SERPL-MCNC: 0.41 MG/DL (ref 0.5–1)
GFR SERPLBLD BASED ON 1.73 SQ M-ARVRAT: 161 ML/MIN (ref 89–?)
GLUCOSE SERPL-MCNC: 77 MG/DL (ref 74–106)
HCO3 BLD-SCNC: 25.4 MEQ/L (ref 21–32)
PROT SERPL-MCNC: 6.7 GM/DL (ref 6.4–8.2)
SODIUM SERPL-SCNC: 141 MEQ/L (ref 136–145)

## 2018-02-25 RX ADMIN — MORPHINE SULFATE PRN MG: 2 INJECTION, SOLUTION INTRAMUSCULAR; INTRAVENOUS at 11:23

## 2018-02-25 RX ADMIN — STANDARDIZED SENNA CONCENTRATE AND DOCUSATE SODIUM SCH TAB: 8.6; 5 TABLET, FILM COATED ORAL at 08:14

## 2018-02-25 RX ADMIN — MORPHINE SULFATE PRN MG: 2 INJECTION, SOLUTION INTRAMUSCULAR; INTRAVENOUS at 08:14

## 2018-02-25 RX ADMIN — PANTOPRAZOLE SCH MG: 40 TABLET, DELAYED RELEASE ORAL at 08:14

## 2018-02-25 RX ADMIN — MORPHINE SULFATE PRN MG: 2 INJECTION, SOLUTION INTRAMUSCULAR; INTRAVENOUS at 00:34

## 2018-02-25 RX ADMIN — Medication SCH ML: at 08:15

## 2018-02-25 RX ADMIN — CEFEPIME SCH MLS/HR: 2 INJECTION, POWDER, FOR SOLUTION INTRAVENOUS at 08:14

## 2018-02-25 RX ADMIN — MORPHINE SULFATE PRN MG: 2 INJECTION, SOLUTION INTRAMUSCULAR; INTRAVENOUS at 05:11

## 2018-02-25 NOTE — HHI.DCPOC
Discharge Care Plan


Diagnosis:  


(1) Abdominal pain


(2) Pancreatitis


(3) Paraplegia


(4) Pneumonia


(5) Constipation


(6) Pancreatitis


(7) Gastritis


(8) Gastric erosion


(9) Hiatal hernia


Goals to Promote Your Health


* To prevent worsening of your condition and complications


* To maintain your health at the optimal level


Directions to Meet Your Goals


*** Take your medications as prescribed


*** Follow your dietary instruction


*** Follow activity as directed








*** Keep your appointments as scheduled


*** Take your immunizations and boosters as scheduled


*** If your symptoms worsen call your PCP, if no PCP go to Urgent Care Center 

or Emergency Room***


*** Smoking is Dangerous to Your Health. Avoid second hand smoke***


***Call the 24-hour hour crisis hotline for domestic abuse at 1-301.812.2832***











Rocco Bautista MD Feb 25, 2018 14:15

## 2018-02-25 NOTE — HHI.FF
Face to Face Verification


Diagnosis:  


(1) Right ischial pressure sore


(2) Acute respiratory failure with hypoxia


(3) UTI (urinary tract infection)


(4) Pancreatitis


(5) Pneumonia


(6) Constipation


(7) Hiatal hernia


(8) Paraplegia


(9) Gastritis


(10) Gastric erosion


(11) Abdominal pain


Physical Therapy


Order:  Evaluate and Treat





Home Health Nursing








Order: Nursing assessment with vital signs











Home Health Aide


Order: To Assist In:  Bathing and personal care, Household chores and meal prep








Order: To Evaluate:  Support services


Order: To Provide:  Long range planning











I have seen patient Darling Campos on 2/25/18. My clinical findings 

support the need for the requested home health care services because:








 Ltd mobility - disease progression





 Limited ability to care for self





 Need for psychosocial assistance














I certify that my clinical findings support that this patient is homebound 

because:








 Non-ambulatory-confined bed/chair

















Rocco Bautista MD Feb 25, 2018 14:22

## 2018-02-25 NOTE — HHI.DS
__________________________________________________





Discharge Summary


Admission Date


Feb 21, 2018 at 22:13


Discharge Date:  Feb 25, 2018


Admitting Diagnosis





acute pancreatitis, UTI





(1) Abdominal pain


ICD Code:  R10.9 - Unspecified abdominal pain


Diagnosis:  Principal


Status:  Acute


(2) Pancreatitis


ICD Code:  K85.90 - Acute pancreatitis without necrosis or infection, 

unspecified


Diagnosis:  Principal


Status:  Resolved


(3) UTI (urinary tract infection)


ICD Code:  N39.0 - Urinary tract infection, site not specified


Diagnosis:  Principal


Status:  Resolved


(4) Paraplegia


ICD Code:  G82.20 - Paraplegia, unspecified


Diagnosis:  Principal


Status:  Chronic


(5) Gastritis


ICD Code:  K29.70 - Gastritis, unspecified, without bleeding


(6) Gastric erosion


ICD Code:  K25.9 - Gastric ulcer, unspecified as acute or chronic, without 

hemorrhage or perforation


Diagnosis:  Principal


Status:  Resolved


(7) Hiatal hernia


ICD Code:  K44.9 - Diaphragmatic hernia without obstruction or gangrene


Diagnosis:  Principal


Status:  Acute


(8) Constipation


ICD Code:  K59.00 - Constipation, unspecified


Diagnosis:  Principal


Status:  Resolved


Procedures


Panendoscopy with biopsy found gastritis, gastric erosion, hiatal hernia.


Brief History - From Admission


53-year-old female with a past medical history significant for paraplegia 

secondary to epidural abscess and history of pulmonary embolus presents to the 

emergency department for evaluation of epigastric pain.  The patient reports a 

three-day history of increasingly worsening nausea and epigastric pain that 

radiates to her back.  She denies any emesis.  She endorses intermittent 

diaphoresis.  She denies fever/chills.  Denies chest pain or shortness of 

breath.  No diarrhea.


CBC/BMP:  


2/22/18 0458                                                                   

             2/25/18 0624





Significant Findings





Laboratory Tests








Test


  2/25/18


06:24


 


Blood Urea Nitrogen 4 MG/DL (7-18) 


 


Creatinine


  0.41 MG/DL


(0.50-1.00)


 


Albumin


  3.2 GM/DL


(3.4-5.0)


 


Potassium Level


  3.0 MEQ/L


(3.5-5.1)








Imaging





Last Impressions








Abdomen/Pelvis CT 2/21/18 2042 Signed





Impressions: 





 Service Date/Time:  Wednesday, February 21, 2018 21:34 - CONCLUSION:  1. Ott 





 catheter within the bladder. 2. Degenerative and postsurgical changes in the 





 hips. 3. Moderate amount of stool within the rectum but no definite 

abnormality 





 to explain the lower, pain identified.     Quentin Sung MD 








PE at Discharge


GENERAL:  female lying in bed


SKIN: No rashes, ecchymoses or lesions. Cool and dry.


HEAD: Atraumatic. Normocephalic. No temporal or scalp tenderness.


EYES: Pupils equal round and reactive. Extraocular motions intact. No scleral 

icterus. No injection or drainage. 


ENT: Nose without bleeding, purulent drainage or septal hematoma. Throat 

without erythema, tonsillar hypertrophy or exudate. Uvula midline. Airway 

patent.


NECK: Trachea midline. No JVD or lymphadenopathy. Supple, nontender, no 

meningeal signs.


CARDIOVASCULAR: Regular rate and rhythm without murmurs, gallops, or rubs. 


RESPIRATORY: Clear to auscultation. Breath sounds equal bilaterally. No wheezes

, rales, or rhonchi.  


GASTROINTESTINAL: Abdomen soft, tender to palpation worse in the mid epigastrium

, nondistended. No hepato-splenomegaly, or palpable masses. No guarding.


MUSCULOSKELETAL: Extremities without clubbing, cyanosis, or edema. No joint 

tenderness, effusion, or edema noted. No calf tenderness. 


NEUROLOGICAL: Awake and alert. Cranial nerves II through XII intact.  Normal 

speech.  Bilateral lower extremity paralysis.


Pt update on day of discharge


The patient states that her abdominal pain is much improved, denies nausea or 

vomiting.  Tolerating diet.  Patient's constipation has also resolved.


Hospital Course


1.  Pancreatitis/Elevated lipase


Patient presented with abdominal pain and elevated lipase of 1266 which 

resolved with bowel rest and IV fluids.  CT of the abdomen and pelvis was 

negative for acute processes.  Pain was managed with morphine as needed.  GI 

was consulted for concerns of peptic ulcer disease versus gastritis.  EGD 

showed gastritis, gastric erosion and hiatal hernia.  The patient was treated 

with a PPI on discharge on this.  The patient was advised to follow-up with 

gastroenterology as an outpatient.





2.  UTI ruled out


Patient with indwelling Ott catheter last changed 7 weeks ago


UA consistent with urinary tract infection.  However urine culture initially 

showed Pseudomonas.  At that time the patient was started on Rocephin IV 

initially and this was switched to cefepime.


Patient was discharged without antibiotics since the patient did not exhibit 

any symptoms of urinary tract infection as well as there were no other signs 

like leukocytosis or fevers.





3.  History of PE


IVC filter in place.





4 constipation


CT abdomen and pelvis showed moderate amount of stool within the rectum but no 

definite abnormality.  Constipation relieved by MiraLAX and magnesium citrate.  

GI consulted and followed the patient for this issue as well.  Patient had a 

bowel movement on 2/24.  Will discharge on senna Colace.





5.  Hypokalemia.


Potassium came down to 3.0, likely secondary to decreased oral intake.  

Replaced with 50 mg of oral potassium prior to discharge.


Pt Condition on Discharge:  Stable


Discharge Disposition:  Disch w/ Home Health Serv


Discharge Time:  > 30 minutes


Discharge Instructions


DIET: Follow Instructions for:  As Tolerated, No Restrictions


Activities you can perform:  See Additionl Instruction


Other Activity Instructions:  


As per PT





Out of bed with assistance


Follow up Referrals:  


Gastroenterology - 4 Weeks with Sneha Kay MD





New Medications:  


Pantoprazole (Pantoprazole) 40 Mg Tab


40 MG PO Q12HR for PUD, #62 TAB





 


Continued Medications:  


Alprazolam (Xanax) 1 Mg Tab


1 MG PO Q8H PRN for ANXIETY, TAB 0 Refills





Baclofen (Baclofen) 20 Mg Tab


20 MG PO TID for Muscle Spasm, TAB 0 Refills





Budesonide-Formoterol Inh (Symbicort Inh) 160-4.5 Mcg/Act Aero


1 PUFF INH Q12HR, #1 INHALER 0 Refills





Nicotine (Eq Nicotine) 14 Mg/24 Hour Dis


1 PATCH T-DERMAL DAILY for TOBACCO DEPENDENCE, #30 PATCH 0 Refills





Oxycodone HCl/Acetaminophen (Oxycodon-Acetaminophen 7.5-325) 7.5 Mg-325 Mg 

Tablet


1 TAB PO Q6H PRN for PAIN, #20 TAB 0 Refills (This prescription has been renewed

)





Pregabalin (Lyrica) 50 Mg Cap


50 MG PO BID, #60 CAP 0 Refills





Venlafaxine ER 24 HR (Effexor XR 24 HR) 150 Mg Cap


150 MG PO DAILY, #30 CAP 0 Refills





 


Discontinued Medications:  


Azithromycin (Azithromycin) 500 Mg Tab


500 MG PO DAILY for Infection, #5 TAB 0 Refills





Cefdinir (Cefdinir) 300 Mg Cap


300 MG PO BID for Infection, #14 CAP 0 Refills





Famotidine (Famotidine) 20 Mg Tab


20 MG PO BID for GERD, #30 TAB 0 Refills





Prednisone (Prednisone) 10 Mg Tab


10 MG PO DAILY for COPD, #18 TAB 0 Refills


TAKE THREE TABLETS BY MOUTH DAILY FOR THREE DAYS


 THEN TAKE TWO TABLETS BY MOUTH DAILY FOR THREE DAYS


 THEN TAKE ONE TABLET BY MOUTH DAILY FOR THREE DAYS.














Rocco Bautista MD Feb 25, 2018 14:36

## 2018-02-25 NOTE — HHI.GIFU
Subjective


Remarks


Feeling somewhat better


Less abdominal pain


Able to eat regular food


No nausea or vomiting


 (Lynsey Phillips)





Objective


Vitals I&O





Vital Signs








  Date Time  Temp Pulse Resp B/P (MAP) Pulse Ox O2 Delivery O2 Flow Rate FiO2


 


2/25/18 08:19   18     


 


2/25/18 08:00 98.2 83 18 112/70 (84) 93   


 


2/25/18 04:05 97.2 74 17 121/76 (91) 98   


 


2/24/18 23:05 97.0 61 18 152/76 (101) 98   


 


2/24/18 20:25 97.5 65 18 127/79 (95) 99   


 


2/24/18 16:00 96.7 63 18 148/91 (110) 99   














I/O      


 


 2/24/18 2/24/18 2/24/18 2/25/18 2/25/18 2/25/18





 07:00 15:00 23:00 07:00 15:00 23:00


 


Intake Total 240 ml 850 ml 1720 ml 1060 ml  


 


Output Total 1250 ml 2125 ml 1250 ml 1150 ml  


 


Balance -1010 ml -1275 ml 470 ml -90 ml  


 


      


 


Intake Oral 240 ml 750 ml 720 ml 960 ml  


 


IV Total  100 ml 1000 ml 100 ml  


 


Output Urine Total 1250 ml 2125 ml 1250 ml 1150 ml  


 


# Bowel Movements 0 1 1 0  








Laboratory





Laboratory Tests








Test


  2/25/18


06:24


 


Blood Urea Nitrogen 4 


 


Creatinine 0.41 


 


Random Glucose 77 


 


Total Protein 6.7 


 


Albumin 3.2 


 


Calcium Level 8.8 


 


Alkaline Phosphatase 66 


 


Aspartate Amino Transf


(AST/SGOT) 29 


 


 


Alanine Aminotransferase


(ALT/SGPT) 29 


 


 


Total Bilirubin 0.4 


 


Sodium Level 141 


 


Potassium Level 3.0 


 


Chloride Level 107 


 


Carbon Dioxide Level 25.4 


 


Anion Gap 9 


 


Estimat Glomerular Filtration


Rate 161 


 


 


Lipase 120 














 Date/Time


Source Procedure


Growth Status


 


 


 2/21/18 18:45


Urine Clean Catch Urine Culture - Final Complete








Physical Exam


HEENT: Pupils round and reactive to light; normocephalic; atraumatic; no 

jaundice.  Oral cavity moist.


NECK: Neck is supple, no JVD, no lymphadenopathy.


CHEST:  Chest is clear


CARDIAC:  Regular rate and rhythm


ABDOMEN:  Taut, mild distention, minimal tenderness on palpation; bowel sounds 

are present in all four quadrants.


EXTREMITIES: No clubbing, cyanosis, or edema.  Upper extremities,  Paraplegia, 

does not move lower extremities


SKIN:  Normal; no rash; no jaundice.,  No obvious rashes


CNS:  No focal deficits; alert and oriented times three.


 (Lynsey Phillips)





Assessment and Plan


Plan


Assessment: history


- Epigastric pain- controlled, resolving


- Acute pancreatitis- lipase initially 1266 , now 317, resolved elevated lipase 

level


- Constipation- chronic issue, paralyzed- MiraLAX and patient's routine from 

home


EGD done on 02/23/18, normal esophagus, gastritis in the gastric antrum, hiatal 

hernia








Plan:


Diet as tolerated


Await biopsies from EGD up to 7-10 days.  Explained to patient


PPI


Avoid NSAIDs


Monitor labs


Protonix


Miralax as needed


Encouraged upper body activity as much as possible


Supportive care


Generalized improvement seen, can follow up with GI as outpatient when 

discharged





Pt has been seen and examined by myself and Dr. Kay, note is written on 

his behalf 


 (Lynsey Phillips)


Plan


Patient is doing better, no abdominal pain, no constipation, continue current 

care, okay to be discharged from GI standpoint


 (Sneha Kay MD)











Lynsey Phillips Feb 25, 2018 13:00


Sneha Kay MD Feb 25, 2018 17:13

## 2018-04-20 ENCOUNTER — HOSPITAL ENCOUNTER (EMERGENCY)
Dept: HOSPITAL 17 - NED | Age: 53
Discharge: LEFT BEFORE DISCHARGE | End: 2018-04-20
Payer: COMMERCIAL

## 2018-04-20 VITALS
HEART RATE: 91 BPM | TEMPERATURE: 98 F | SYSTOLIC BLOOD PRESSURE: 175 MMHG | OXYGEN SATURATION: 99 % | DIASTOLIC BLOOD PRESSURE: 96 MMHG | RESPIRATION RATE: 16 BRPM

## 2018-04-20 DIAGNOSIS — R10.9: Primary | ICD-10-CM

## 2018-04-20 PROCEDURE — 99281 EMR DPT VST MAYX REQ PHY/QHP: CPT
